# Patient Record
Sex: FEMALE | Race: WHITE | NOT HISPANIC OR LATINO | Employment: FULL TIME | URBAN - METROPOLITAN AREA
[De-identification: names, ages, dates, MRNs, and addresses within clinical notes are randomized per-mention and may not be internally consistent; named-entity substitution may affect disease eponyms.]

---

## 2018-01-11 ENCOUNTER — OFFICE VISIT CONVERTED (OUTPATIENT)
Dept: FAMILY MEDICINE CLINIC | Facility: CLINIC | Age: 33
End: 2018-01-11
Attending: NURSE PRACTITIONER

## 2018-04-10 ENCOUNTER — OFFICE VISIT CONVERTED (OUTPATIENT)
Dept: FAMILY MEDICINE CLINIC | Facility: CLINIC | Age: 33
End: 2018-04-10
Attending: NURSE PRACTITIONER

## 2018-05-17 ENCOUNTER — OFFICE VISIT CONVERTED (OUTPATIENT)
Dept: FAMILY MEDICINE CLINIC | Facility: CLINIC | Age: 33
End: 2018-05-17
Attending: NURSE PRACTITIONER

## 2018-06-25 ENCOUNTER — CONVERSION ENCOUNTER (OUTPATIENT)
Dept: FAMILY MEDICINE CLINIC | Facility: CLINIC | Age: 33
End: 2018-06-25

## 2018-06-25 ENCOUNTER — OFFICE VISIT CONVERTED (OUTPATIENT)
Dept: FAMILY MEDICINE CLINIC | Facility: CLINIC | Age: 33
End: 2018-06-25
Attending: NURSE PRACTITIONER

## 2018-07-17 ENCOUNTER — OFFICE VISIT CONVERTED (OUTPATIENT)
Dept: FAMILY MEDICINE CLINIC | Facility: CLINIC | Age: 33
End: 2018-07-17
Attending: NURSE PRACTITIONER

## 2018-07-17 ENCOUNTER — CONVERSION ENCOUNTER (OUTPATIENT)
Dept: FAMILY MEDICINE CLINIC | Facility: CLINIC | Age: 33
End: 2018-07-17

## 2018-08-15 ENCOUNTER — OFFICE VISIT CONVERTED (OUTPATIENT)
Dept: FAMILY MEDICINE CLINIC | Facility: CLINIC | Age: 33
End: 2018-08-15
Attending: NURSE PRACTITIONER

## 2018-10-17 ENCOUNTER — OFFICE VISIT CONVERTED (OUTPATIENT)
Dept: FAMILY MEDICINE CLINIC | Facility: CLINIC | Age: 33
End: 2018-10-17
Attending: NURSE PRACTITIONER

## 2018-12-20 ENCOUNTER — OFFICE VISIT CONVERTED (OUTPATIENT)
Dept: FAMILY MEDICINE CLINIC | Facility: CLINIC | Age: 33
End: 2018-12-20
Attending: NURSE PRACTITIONER

## 2019-03-11 ENCOUNTER — OFFICE VISIT CONVERTED (OUTPATIENT)
Dept: FAMILY MEDICINE CLINIC | Facility: CLINIC | Age: 34
End: 2019-03-11
Attending: NURSE PRACTITIONER

## 2019-04-08 ENCOUNTER — CONVERSION ENCOUNTER (OUTPATIENT)
Dept: OTHER | Facility: HOSPITAL | Age: 34
End: 2019-04-08

## 2019-04-10 ENCOUNTER — HOSPITAL ENCOUNTER (OUTPATIENT)
Dept: OTHER | Facility: HOSPITAL | Age: 34
Discharge: HOME | End: 2019-04-10
Attending: OBSTETRICS & GYNECOLOGY

## 2019-04-15 LAB
CONV LAST MENSTURAL PERIOD: NORMAL
SPECIMEN SOURCE: NORMAL
SPECIMEN SOURCE: NORMAL
THIN PREP CVX: NORMAL

## 2019-06-28 ENCOUNTER — HOSPITAL ENCOUNTER (OUTPATIENT)
Dept: OTHER | Facility: HOSPITAL | Age: 34
Discharge: HOME | End: 2019-06-28

## 2019-06-28 LAB
ALBUMIN SERPL-MCNC: 4 G/DL (ref 3.5–5)
ALBUMIN/GLOB SERPL: 1.3 {RATIO} (ref 1.4–2.6)
ALP SERPL-CCNC: 80 U/L (ref 42–98)
ALT SERPL-CCNC: 28 U/L (ref 10–40)
ANION GAP SERPL CALC-SCNC: 13 MMOL/L (ref 8–19)
AST SERPL-CCNC: 20 U/L (ref 15–50)
BASOPHILS # BLD AUTO: 0.04 10*3/UL (ref 0–0.2)
BASOPHILS NFR BLD AUTO: 0.8 % (ref 0–3)
BILIRUB SERPL-MCNC: 0.44 MG/DL (ref 0.2–1.3)
BUN SERPL-MCNC: 8 MG/DL (ref 5–25)
BUN/CREAT SERPL: 13 {RATIO} (ref 6–20)
CALCIUM SERPL-MCNC: 8.7 MG/DL (ref 8.7–10.4)
CHLORIDE SERPL-SCNC: 102 MMOL/L (ref 99–111)
CHOLEST SERPL-MCNC: 158 MG/DL (ref 107–200)
CHOLEST/HDLC SERPL: 4.3 {RATIO} (ref 3–6)
CONV ABS IMM GRAN: 0.01 10*3/UL (ref 0–0.2)
CONV CO2: 30 MMOL/L (ref 22–32)
CONV IMMATURE GRAN: 0.2 % (ref 0–1.8)
CONV TOTAL PROTEIN: 7.2 G/DL (ref 6.3–8.2)
CREAT UR-MCNC: 0.62 MG/DL (ref 0.5–0.9)
DEPRECATED RDW RBC AUTO: 39.9 FL (ref 36.4–46.3)
EOSINOPHIL # BLD AUTO: 0.15 10*3/UL (ref 0–0.7)
EOSINOPHIL # BLD AUTO: 3.1 % (ref 0–7)
ERYTHROCYTE [DISTWIDTH] IN BLOOD BY AUTOMATED COUNT: 12.5 % (ref 11.7–14.4)
GFR SERPLBLD BASED ON 1.73 SQ M-ARVRAT: >60 ML/MIN/{1.73_M2}
GLOBULIN UR ELPH-MCNC: 3.2 G/DL (ref 2–3.5)
GLUCOSE SERPL-MCNC: 84 MG/DL (ref 65–99)
HBA1C MFR BLD: 11.1 G/DL (ref 12–16)
HCT VFR BLD AUTO: 33.2 % (ref 37–47)
HDLC SERPL-MCNC: 37 MG/DL (ref 40–60)
LDLC SERPL CALC-MCNC: 113 MG/DL (ref 70–100)
LYMPHOCYTES # BLD AUTO: 1.64 10*3/UL (ref 1–5)
MCH RBC QN AUTO: 29.2 PG (ref 27–31)
MCHC RBC AUTO-ENTMCNC: 33.4 G/DL (ref 33–37)
MCV RBC AUTO: 87.4 FL (ref 81–99)
MONOCYTES # BLD AUTO: 0.34 10*3/UL (ref 0.2–1.2)
MONOCYTES NFR BLD AUTO: 7.1 % (ref 3–10)
NEUTROPHILS # BLD AUTO: 2.6 10*3/UL (ref 2–8)
NEUTROPHILS NFR BLD AUTO: 54.5 % (ref 30–85)
NRBC CBCN: 0 % (ref 0–0.7)
OSMOLALITY SERPL CALC.SUM OF ELEC: 292 MOSM/KG (ref 273–304)
PLATELET # BLD AUTO: 280 10*3/UL (ref 130–400)
PMV BLD AUTO: 9.8 FL (ref 9.4–12.3)
POTASSIUM SERPL-SCNC: 3.1 MMOL/L (ref 3.5–5.3)
RBC # BLD AUTO: 3.8 10*6/UL (ref 4.2–5.4)
SODIUM SERPL-SCNC: 142 MMOL/L (ref 135–147)
TRIGL SERPL-MCNC: 39 MG/DL (ref 40–150)
TSH SERPL-ACNC: 1.71 M[IU]/L (ref 0.27–4.2)
VARIANT LYMPHS NFR BLD MANUAL: 34.3 % (ref 20–45)
VLDLC SERPL-MCNC: 8 MG/DL (ref 5–37)
WBC # BLD AUTO: 4.78 10*3/UL (ref 4.8–10.8)

## 2019-07-16 ENCOUNTER — OFFICE VISIT CONVERTED (OUTPATIENT)
Dept: FAMILY MEDICINE CLINIC | Facility: CLINIC | Age: 34
End: 2019-07-16
Attending: NURSE PRACTITIONER

## 2019-08-12 ENCOUNTER — HOSPITAL ENCOUNTER (OUTPATIENT)
Dept: OTHER | Facility: HOSPITAL | Age: 34
Discharge: HOME | End: 2019-08-12
Attending: NURSE PRACTITIONER

## 2019-08-15 ENCOUNTER — OFFICE VISIT CONVERTED (OUTPATIENT)
Dept: FAMILY MEDICINE CLINIC | Facility: CLINIC | Age: 34
End: 2019-08-15
Attending: NURSE PRACTITIONER

## 2019-09-17 ENCOUNTER — OFFICE VISIT CONVERTED (OUTPATIENT)
Dept: FAMILY MEDICINE CLINIC | Facility: CLINIC | Age: 34
End: 2019-09-17
Attending: NURSE PRACTITIONER

## 2019-10-29 ENCOUNTER — OFFICE VISIT CONVERTED (OUTPATIENT)
Dept: FAMILY MEDICINE CLINIC | Facility: CLINIC | Age: 34
End: 2019-10-29
Attending: NURSE PRACTITIONER

## 2019-10-29 ENCOUNTER — CONVERSION ENCOUNTER (OUTPATIENT)
Dept: FAMILY MEDICINE CLINIC | Facility: CLINIC | Age: 34
End: 2019-10-29

## 2019-12-04 ENCOUNTER — OFFICE VISIT CONVERTED (OUTPATIENT)
Dept: FAMILY MEDICINE CLINIC | Facility: CLINIC | Age: 34
End: 2019-12-04
Attending: NURSE PRACTITIONER

## 2019-12-04 ENCOUNTER — CONVERSION ENCOUNTER (OUTPATIENT)
Dept: FAMILY MEDICINE CLINIC | Facility: CLINIC | Age: 34
End: 2019-12-04

## 2020-01-08 ENCOUNTER — CONVERSION ENCOUNTER (OUTPATIENT)
Dept: FAMILY MEDICINE CLINIC | Facility: CLINIC | Age: 35
End: 2020-01-08

## 2020-01-08 ENCOUNTER — OFFICE VISIT CONVERTED (OUTPATIENT)
Dept: FAMILY MEDICINE CLINIC | Facility: CLINIC | Age: 35
End: 2020-01-08
Attending: NURSE PRACTITIONER

## 2020-02-04 ENCOUNTER — OFFICE VISIT CONVERTED (OUTPATIENT)
Dept: FAMILY MEDICINE CLINIC | Facility: CLINIC | Age: 35
End: 2020-02-04
Attending: NURSE PRACTITIONER

## 2020-06-18 ENCOUNTER — HOSPITAL ENCOUNTER (OUTPATIENT)
Dept: OTHER | Facility: HOSPITAL | Age: 35
Discharge: HOME | End: 2020-06-18

## 2020-06-18 LAB
ALBUMIN SERPL-MCNC: 4 G/DL (ref 3.5–5)
ALBUMIN/GLOB SERPL: 1.2 {RATIO} (ref 1.4–2.6)
ALP SERPL-CCNC: 91 U/L (ref 42–98)
ALT SERPL-CCNC: 27 U/L (ref 10–40)
ANION GAP SERPL CALC-SCNC: 11 MMOL/L (ref 8–19)
AST SERPL-CCNC: 25 U/L (ref 15–50)
BASOPHILS # BLD AUTO: 0.07 10*3/UL (ref 0–0.2)
BASOPHILS NFR BLD AUTO: 1.3 % (ref 0–3)
BILIRUB SERPL-MCNC: 0.29 MG/DL (ref 0.2–1.3)
BUN SERPL-MCNC: 9 MG/DL (ref 5–25)
BUN/CREAT SERPL: 12 {RATIO} (ref 6–20)
CALCIUM SERPL-MCNC: 8.7 MG/DL (ref 8.7–10.4)
CHLORIDE SERPL-SCNC: 101 MMOL/L (ref 99–111)
CHOLEST SERPL-MCNC: 166 MG/DL (ref 107–200)
CHOLEST/HDLC SERPL: 4.2 {RATIO} (ref 3–6)
CONV ABS IMM GRAN: 0.01 10*3/UL (ref 0–0.2)
CONV CO2: 27 MMOL/L (ref 22–32)
CONV IMMATURE GRAN: 0.2 % (ref 0–1.8)
CONV TOTAL PROTEIN: 7.3 G/DL (ref 6.3–8.2)
CREAT UR-MCNC: 0.75 MG/DL (ref 0.5–0.9)
DEPRECATED RDW RBC AUTO: 39.3 FL (ref 36.4–46.3)
EOSINOPHIL # BLD AUTO: 0.24 10*3/UL (ref 0–0.7)
EOSINOPHIL # BLD AUTO: 4.6 % (ref 0–7)
ERYTHROCYTE [DISTWIDTH] IN BLOOD BY AUTOMATED COUNT: 12.6 % (ref 11.7–14.4)
GFR SERPLBLD BASED ON 1.73 SQ M-ARVRAT: >60 ML/MIN/{1.73_M2}
GLOBULIN UR ELPH-MCNC: 3.3 G/DL (ref 2–3.5)
GLUCOSE SERPL-MCNC: 73 MG/DL (ref 65–99)
HCT VFR BLD AUTO: 35.7 % (ref 37–47)
HDLC SERPL-MCNC: 40 MG/DL (ref 40–60)
HGB BLD-MCNC: 11.6 G/DL (ref 12–16)
LDLC SERPL CALC-MCNC: 117 MG/DL (ref 70–100)
LYMPHOCYTES # BLD AUTO: 1.75 10*3/UL (ref 1–5)
LYMPHOCYTES NFR BLD AUTO: 33.5 % (ref 20–45)
MCH RBC QN AUTO: 27.9 PG (ref 27–31)
MCHC RBC AUTO-ENTMCNC: 32.5 G/DL (ref 33–37)
MCV RBC AUTO: 85.8 FL (ref 81–99)
MONOCYTES # BLD AUTO: 0.45 10*3/UL (ref 0.2–1.2)
MONOCYTES NFR BLD AUTO: 8.6 % (ref 3–10)
NEUTROPHILS # BLD AUTO: 2.7 10*3/UL (ref 2–8)
NEUTROPHILS NFR BLD AUTO: 51.8 % (ref 30–85)
NRBC CBCN: 0 % (ref 0–0.7)
OSMOLALITY SERPL CALC.SUM OF ELEC: 279 MOSM/KG (ref 273–304)
PLATELET # BLD AUTO: 325 10*3/UL (ref 130–400)
PMV BLD AUTO: 9.8 FL (ref 9.4–12.3)
POTASSIUM SERPL-SCNC: 3 MMOL/L (ref 3.5–5.3)
RBC # BLD AUTO: 4.16 10*6/UL (ref 4.2–5.4)
SODIUM SERPL-SCNC: 136 MMOL/L (ref 135–147)
TRIGL SERPL-MCNC: 47 MG/DL (ref 40–150)
TSH SERPL-ACNC: 1.73 M[IU]/L (ref 0.27–4.2)
VLDLC SERPL-MCNC: 9 MG/DL (ref 5–37)
WBC # BLD AUTO: 5.22 10*3/UL (ref 4.8–10.8)

## 2020-07-02 ENCOUNTER — TELEMEDICINE CONVERTED (OUTPATIENT)
Dept: FAMILY MEDICINE CLINIC | Facility: CLINIC | Age: 35
End: 2020-07-02
Attending: NURSE PRACTITIONER

## 2020-07-28 ENCOUNTER — HOSPITAL ENCOUNTER (OUTPATIENT)
Dept: URGENT CARE | Facility: CLINIC | Age: 35
Discharge: HOME | End: 2020-07-28
Attending: NURSE PRACTITIONER

## 2020-07-28 LAB — SARS-COV-2 RNA SPEC QL NAA+PROBE: NOT DETECTED

## 2020-07-30 ENCOUNTER — HOSPITAL ENCOUNTER (OUTPATIENT)
Dept: URGENT CARE | Facility: CLINIC | Age: 35
Discharge: HOME | End: 2020-07-30
Attending: NURSE PRACTITIONER

## 2020-07-30 ENCOUNTER — TELEMEDICINE CONVERTED (OUTPATIENT)
Dept: FAMILY MEDICINE CLINIC | Facility: CLINIC | Age: 35
End: 2020-07-30
Attending: NURSE PRACTITIONER

## 2020-07-31 LAB — SARS-COV-2 RNA SPEC QL NAA+PROBE: NOT DETECTED

## 2020-11-11 ENCOUNTER — OFFICE VISIT CONVERTED (OUTPATIENT)
Dept: FAMILY MEDICINE CLINIC | Facility: CLINIC | Age: 35
End: 2020-11-11
Attending: NURSE PRACTITIONER

## 2020-11-11 ENCOUNTER — CONVERSION ENCOUNTER (OUTPATIENT)
Dept: FAMILY MEDICINE CLINIC | Facility: CLINIC | Age: 35
End: 2020-11-11

## 2020-11-25 ENCOUNTER — HOSPITAL ENCOUNTER (OUTPATIENT)
Dept: URGENT CARE | Facility: CLINIC | Age: 35
Discharge: HOME | End: 2020-11-25
Attending: EMERGENCY MEDICINE

## 2020-11-28 LAB — SARS-COV-2 RNA SPEC QL NAA+PROBE: DETECTED

## 2020-12-28 ENCOUNTER — HOSPITAL ENCOUNTER (OUTPATIENT)
Dept: OTHER | Facility: HOSPITAL | Age: 35
Discharge: HOME | End: 2020-12-28
Attending: INTERNAL MEDICINE

## 2021-01-21 ENCOUNTER — HOSPITAL ENCOUNTER (OUTPATIENT)
Dept: OTHER | Facility: HOSPITAL | Age: 36
Discharge: HOME | End: 2021-01-21
Attending: INTERNAL MEDICINE

## 2021-02-02 ENCOUNTER — OFFICE VISIT CONVERTED (OUTPATIENT)
Dept: FAMILY MEDICINE CLINIC | Facility: CLINIC | Age: 36
End: 2021-02-02
Attending: NURSE PRACTITIONER

## 2021-03-02 ENCOUNTER — TELEMEDICINE CONVERTED (OUTPATIENT)
Dept: FAMILY MEDICINE CLINIC | Facility: CLINIC | Age: 36
End: 2021-03-02
Attending: NURSE PRACTITIONER

## 2021-04-01 ENCOUNTER — TELEMEDICINE CONVERTED (OUTPATIENT)
Dept: FAMILY MEDICINE CLINIC | Facility: CLINIC | Age: 36
End: 2021-04-01
Attending: NURSE PRACTITIONER

## 2021-05-04 ENCOUNTER — TELEMEDICINE CONVERTED (OUTPATIENT)
Dept: FAMILY MEDICINE CLINIC | Facility: CLINIC | Age: 36
End: 2021-05-04
Attending: NURSE PRACTITIONER

## 2021-05-13 NOTE — PROGRESS NOTES
Progress Note      Patient Name: Ofelia Barrera   Patient ID: [id number]   Sex: Female   Birthdate: Sue 10, 1985    Primary Care Provider: Jojo SMALLS   Referring Provider: Jojo SMALLS    Visit Date: July 2, 2020    Provider: SERINA Smith   Location: [facility]   Location Address: [address] [place] [address], [address]  [place]  [id number]   Location Phone: [phone]          Chief Complaint  · anxiety      History Of Present Illness  Video Conferencing Visit  Ofelia Barrera is a 35 year old /White female who is presenting for evaluation via video conferencing via [place]. Verbal consent obtained before beginning visit.   The following staff were present during this visit: SERINA Smith and FABIOLA Peace   Ofelia Barrera is a 35 year old /White female who presents for evaluation and treatment of:      Pt has been having increased anxiety recently and would like to start a medication. Pt has not been on any previous anxiety medications and did not prefer one over the other.    Pt's  recently had a second affair and the lady he had an affair with is pregnant she is having trouble focusing and not crying all the time.       Past Medical History  Disease Name Date Onset Notes   Back Pain  --  --    Calculus Of Kidney --  --    Hypertension, essential --  --    Lumbar disc w/o myelopathy 11/19/2013 --    Pap smear for cervical cancer screening 3/1/2019 EPW   Sciatica 11/19/2013 Recurrent symptoms in the left S1 dist.   Weight gain --  --          Past Surgical History  Procedure Name Date Notes   Lithotripsy --  --    Minimally invasive Discectomy 10/28/2013 --          Medication List  Name Date Started Instructions   Ativan 1 mg oral tablet  take 1/2 to 1 tablet by mouth TID PRN   hydrochlorothiazide 25 mg oral tablet 04/14/2020 TAKE ONE TABLET BY MOUTH DAILY   lisinopril 10 mg oral tablet 04/14/2020 TAKE ONE TABLET BY MOUTH DAILY   Mirena 20 mcg/24 hour  (5 years) intrauterine intrauterine device  --    multivitamin oral tablet  take 1 tablet by oral route daily         Allergy List  Allergen Name Date Reaction Notes   NO KNOWN DRUG ALLERGIES --  --  --          Family Medical History  Disease Name Relative/Age Notes   Spine Problems  --          Reproductive History  Menstrual   Age Menarche: 11 Method of Birth Control: IUD   Pregnancy Summary   Total Pregnancies: 1 Full Term: 1 Premature: 0   Ab Induced: 0 Ab Spontaneous: 0 Ectopics: 0   Multiples: 0 Livin         Social History  Finding Status Start/Stop Quantity Notes   Alcohol Never --/-- --  2018 -     --  --/-- --  --    Moderate Amount of Exercise (1-3 times weekly) --  --/-- --  --    No known infection risk --  --/-- --  --    Tobacco Never --/-- --  --          Immunizations  NameDate Admin Mfg Trade Name Lot Number Route Inj VIS Given VIS Publication   [id number]/2019 NE Not Entered  NE NE     Comments: pt reports flu received at [facility] 10/19   Vbmvxuven02/2018 NE Not Entered  NE NE     Comments: Administered at [facility]   Tdap2016 SKB BOOSTRIX [id number]  RA 2016   Comments:          Review of Systems  · Constitutional  o Denies  o : fever, fatigue, weight loss, weight gain  · Cardiovascular  o Denies  o : lower extremity edema, claudication, chest pressure, palpitations  · Respiratory  o Denies  o : shortness of breath, wheezing, cough, hemoptysis, dyspnea on exertion  · Gastrointestinal  o Denies  o : nausea, vomiting, diarrhea, constipation, abdominal pain  · Psychiatric  o Admits  o : anxiety      Physical Examination  · Constitutional  o Appearance  o : well-nourished, well developed, alert, in no acute distress  · Psychiatric  o Mood and Affect  o : mood normal, affect appropriate, denies any SI/HI          Assessment  · Anxiety disorder     300.00/F41.9  · Essential hypertension     401.9/I10      Plan  · Orders  o FELIPE Report (KASPR) - -  "07/03/2020  o ACO-39: Current medications updated and reviewed () - - 07/02/2020  · Medications  o Medications have been Reconciled  o Transition of Care or Provider Policy  · Instructions  o Discussed the need for therapy, either with a certified counselor, psychologist, and/or family . If no improvement is noted or worsening of their condition, return to office or ER. But also discussed with patient that if they are non-responsive to the type of medication they may need to see a psychiatrist for further evaluation and management.  o Patient agrees to a \"No Self Harm\" contract. Patient will either call , [facility], ER, [facility], [facility].  o Obtained a written consent for FELIPE query. Discussed the risk and benefits of the use of controlled substances with the patient, including the risk of tolerance and drug dependence. The patient has been counseled on the need to have an exit strategy, including potentially discontinuing the use of controlled substances. FELIPE has or will be reviewed as soon as it becomes avaliable.  o Patient was educated/instructed on their diagnosis, treatment and medications prior to discharge from the clinic today.  o Patient instructed to seek medical attention urgently for new or worsening symptoms.  o Call the office with any concerns or questions.  o started Ativan PRN  · Disposition  o Return Visit Request in/on 1 month +/- 2 weeks ([id number]).            Electronically Signed by: SERINA Smith -Author on July 3, 2020 09:37:14 AM  "

## 2021-05-13 NOTE — PROGRESS NOTES
Progress Note      Patient Name: Ofelia Barrera   Patient ID: [id number]   Sex: Female   Birthdate: Sue 10, 1985    Primary Care Provider: Jojo SMALLS   Referring Provider: Jojo SMALLS    Visit Date: November 11, 2020    Provider: SERINA Smith   Location: [facility]   Location Address: [address] [place] [address], [address]  [place]  [id number]   Location Phone: [phone]          Chief Complaint  · physical for insurance      History Of Present Illness  Ofelia Barrera is a 35 year old /White female who presents for evaluation and treatment of:      Pt is here for a physical for insurance. She did not bring her form with her, she will get to office to complete.     Pt has no concerns today    Pt has received the flu vaccine and HRA labs done 7/30/2020.       Past Medical History  Disease Name Date Onset Notes   Back Pain  --  --    Calculus Of Kidney --  --    Hypertension, essential --  --    Lumbar disc w/o myelopathy 11/19/2013 --    Pap smear for cervical cancer screening 3/1/2019 EPW   Sciatica 11/19/2013 Recurrent symptoms in the left S1 dist.   Weight gain --  --          Past Surgical History  Procedure Name Date Notes   Lithotripsy --  --    Minimally invasive Discectomy 10/28/2013 --          Medication List  Name Date Started Instructions   hydrochlorothiazide 25 mg oral tablet 10/20/2020 TAKE ONE TABLET BY MOUTH DAILY   lisinopril 10 mg oral tablet 10/20/2020 TAKE ONE TABLET BY MOUTH DAILY   Mirena 20 mcg/24 hour (5 years) intrauterine intrauterine device  --    multivitamin oral tablet  take 1 tablet by oral route daily         Allergy List  Allergen Name Date Reaction Notes   NO KNOWN DRUG ALLERGIES --  --  --          Family Medical History  Disease Name Relative/Age Notes   Spine Problems  --          Reproductive History  Menstrual   Age Menarche: 11 Method of Birth Control: IUD   Pregnancy Summary   Total Pregnancies: 1 Full Term: 1 Premature: 0  "  Ab Induced: 0 Ab Spontaneous: 0 Ectopics: 0   Multiples: 0 Livin         Social History  Finding Status Start/Stop Quantity Notes   Alcohol Never --/-- --  2018 -     --  --/-- --  --    Moderate Amount of Exercise (1-3 times weekly) --  --/-- --  --    No known infection risk --  --/-- --  --    Tobacco Never --/-- --  --          Immunizations  NameDate Admin Mfg Trade Name Lot Number Route Inj VIS Given VIS Publication   [id number]/2019 NE Not Entered  NE NE     Comments: pt reports flu received at [facility] 10/19   Tdap2016 SKB BOOSTRIX [id number] IM RA 2016   Comments:          Review of Systems  · Constitutional  o Denies  o : fever, fatigue, weight loss, weight gain  · Cardiovascular  o Denies  o : lower extremity edema, claudication, chest pressure, palpitations  · Respiratory  o Denies  o : shortness of breath, wheezing, cough, hemoptysis, dyspnea on exertion  · Gastrointestinal  o Denies  o : nausea, vomiting, diarrhea, constipation, abdominal pain      Vitals  Date Time BP Position Site L\R Cuff Size HR RR TEMP (F) WT  HT  BMI kg/m2 BSA m2 O2 Sat FR L/min FiO2 HC       2019 08:09 /72 Sitting    78 - R   177lbs 6oz 5'  8\" 26.97 1.96 100 %      2020 08:03 /77 Sitting    88 - R   174lbs 16oz 5'  8\" 26.61 1.95 100 %      2020 08:17 /75 Sitting    73 - R   179lbs 2oz 5'  8\" 27.24 1.97 100 %      2020 02:08 /69 Sitting    72 - R   210lbs 4oz 5'  8\" 31.97 2.14 100 %            Physical Examination  · Constitutional  o Appearance  o : well-nourished, well developed, alert, in no acute distress  · Ears, Nose, Mouth and Throat  o Ears  o :   § External Ears  § : appearance within normal limits, no lesions present  § Otoscopic Examination  § : tympanic membrane appearance within normal limits bilaterally without perforations, mobility normal  o Nose  o :   § External Nose  § : normal stucture noted.  § Intranasal Exam  § : no " swelling, reddness, turbs normal deniz.  o Oral Cavity  o :   § Oral Mucosa  § : oral mucosa normal without pallor or cyanosis  § Lips  § : lip appearance normal  § Teeth  § : normal dentition for age  § Gums  § : gums pink, non-swollen, no bleeding present  § Tongue  § : tongue appearance normal  § Palate  § : hard palate normal, soft palate appearance normal  o Throat  o :   § Oropharynx  § : no inflammation or lesions present, tonsils within normal limits  · Respiratory  o Respiratory Effort  o : breathing unlabored  o Auscultation of Lungs  o : normal breath sounds throughout  · Cardiovascular  o Heart  o :   § Auscultation of Heart  § : regular rate and rhythm, no murmurs, gallops or rubs  § Palpation of Heart  § : normal apical impulse, no cardiac thrill present  o Peripheral Vascular System  o :   § Extremities  § : no cyanosis, clubbing or edema; less than 2 second refill noted  · Gastrointestinal  o Abdominal Examination  o : abdomen nontender to palpation, tone normal without rigidity or guarding, no masses present, abdominal contour scaphoid  o Liver and spleen  o : no hepatomegaly present, liver nontender to palpation, spleen not palpable  · Skin and Subcutaneous Tissue  o General Inspection  o : no rashes or lesions present, no areas of discoloration  · Neurologic  o Mental Status Examination  o :   § Orientation  § : grossly oriented to person, place and time  o Cranial Nerves  o : cranial nerves intact and symmetric throughout  · Psychiatric  o Mood and Affect  o : mood normal, affect appropriate, denies any SI/HI          Assessment  · Annual physical exam     V70.0/Z00.00  · Screening for depression     V79.0/Z13.89  · Hypertension, essential     401.9/I10    Problems Reconciled  Plan  · Orders  o ACO-18: Negative screen for clinical depression using a standardized tool () - V79.0/Z13.89 - 11/11/2020  o ACO-39: Current medications updated and reviewed (, 1159F) - - 11/11/2020  o ACO-18:  Negative screen for clinical depression using a standardized tool () - - 11/11/2020  o ACO-14: Influenza immunization administered or previously received [facility] () - - 11/11/2020  · Medications  o Medications have been Reconciled  o Transition of Care or Provider Policy  · Instructions  o Reviewed health maintenance flowsheet and updated information. Orders were placed and/or patient's response was documented.  o Depression Screen completed and scanned into the EMR under the designated folder within the patient's documents.  o Today's PHQ-9 result is _0__  o The provider screening met the required time of 15 minutes.  o Patient was educated/instructed on their diagnosis, treatment and medications prior to discharge from the clinic today.  o Patient instructed to seek medical attention urgently for new or worsening symptoms.  o Call the office with any concerns or questions.            Electronically Signed by: SERINA Smiht -Author on November 11, 2020 02:41:11 PM

## 2021-05-13 NOTE — PROGRESS NOTES
"   Progress Note      Patient Name: Ofelia Barrera   Patient ID: [id number]   Sex: Female   Birthdate: Sue 10, 1985    Primary Care Provider: Jojo SMALLS   Referring Provider: Jojo SMALLS    Visit Date: July 30, 2020    Provider: SERINA Smith   Location: [facility]   Location Address: [address] [place] [address], [address]  [place]  [id number]   Location Phone: [phone]          Chief Complaint  · anxiety follow up      History Of Present Illness  Video Conferencing Visit  Ofelia Barrera is a 35 year old /White female who is presenting for evaluation via video conferencing via [employer] [place]. Verbal consent obtained before beginning visit.   The following staff were present during this visit: Josefina Flores MA   Ofelia Barrera is a 35 year old /White female who presents for evaluation and treatment of:      Pt is following up on her anxiety, she notes that it is much better. She has no concerns at this time.  She met with a  and has a plan if the lady her  had an affair with is in fact pregnant.  She also states that they are no longer having any contact with that lady and just \"working on their marriage.\"    She has only taken the Ativan at night a few times when her mind won't shut off.    She is due S and will stop into clinic for this.    She is on Covid quarantine and has had one test and is still waiting on results.  She was exposed at work.           Past Medical History  Disease Name Date Onset Notes   Back Pain  --  --    Calculus Of Kidney --  --    Hypertension, essential --  --    Lumbar disc w/o myelopathy 11/19/2013 --    Pap smear for cervical cancer screening 3/1/2019 EPW   Sciatica 11/19/2013 Recurrent symptoms in the left S1 dist.   Weight gain --  --          Past Surgical History  Procedure Name Date Notes   Lithotripsy --  --    Minimally invasive Discectomy 10/28/2013 --          Medication List  Name Date Started Instructions   Ativan 1 mg oral " tablet  take 1/2 to 1 tablet by mouth TID PRN   hydrochlorothiazide 25 mg oral tablet 2020 TAKE ONE TABLET BY MOUTH DAILY   lisinopril 10 mg oral tablet 2020 TAKE ONE TABLET BY MOUTH DAILY   Mirena 20 mcg/24 hour (5 years) intrauterine intrauterine device  --    multivitamin oral tablet  take 1 tablet by oral route daily         Allergy List  Allergen Name Date Reaction Notes   NO KNOWN DRUG ALLERGIES --  --  --          Family Medical History  Disease Name Relative/Age Notes   Spine Problems  --          Reproductive History  Menstrual   Age Menarche: 11 Method of Birth Control: IUD   Pregnancy Summary   Total Pregnancies: 1 Full Term: 1 Premature: 0   Ab Induced: 0 Ab Spontaneous: 0 Ectopics: 0   Multiples: 0 Livin         Social History  Finding Status Start/Stop Quantity Notes   Alcohol Never --/-- --  2018 -     --  --/-- --  --    Moderate Amount of Exercise (1-3 times weekly) --  --/-- --  --    No known infection risk --  --/-- --  --    Tobacco Never --/-- --  --          Immunizations  NameDate Admin Mfg Trade Name Lot Number Route Inj VIS Given VIS Publication   [id number]/2019 NE Not Entered  NE NE     Comments: pt reports flu received at [facility] 10/19   Jjfvgufph61/2018 NE Not Entered  NE NE     Comments: Administered at [facility]   Tdap2016 SKB BOOSTRIX [id number] IM RA 2016   Comments:          Review of Systems  · Constitutional  o Denies  o : fever, fatigue, weight loss, weight gain  · Cardiovascular  o Denies  o : lower extremity edema, claudication, chest pressure, palpitations  · Respiratory  o Denies  o : shortness of breath, wheezing, cough, hemoptysis, dyspnea on exertion  · Gastrointestinal  o Denies  o : nausea, vomiting, diarrhea, constipation, abdominal pain  · Psychiatric  o Admits  o : anxiety      Physical Examination  · Constitutional  o Appearance  o : well-nourished, well developed, alert, in no acute distress  · Skin and Subcutaneous  "Tissue  o General Inspection  o : no rashes or lesions present, no areas of discoloration  · Neurologic  o Mental Status Examination  o :   § Orientation  § : grossly oriented to person, place and time  · Psychiatric  o Mood and Affect  o : mood normal, affect appropriate, denies any SI/HI          Assessment  · Anxiety disorder     300.00/F41.9  · Hypertension, essential     401.9/I10      Plan  · Orders  o FELIPE Report (KASPR) - - 07/30/2020  o ACO-39: Current medications updated and reviewed () - - 07/30/2020  · Medications  o Medications have been Reconciled  o Transition of Care or Provider Policy  · Instructions  o Patient agrees to a \"No Self Harm\" contract. Patient will either call , [facility], , [facility], [facility].  o Obtained a written consent for FELIPE query. Discussed the risk and benefits of the use of controlled substances with the patient, including the risk of tolerance and drug dependence. The patient has been counseled on the need to have an exit strategy, including potentially discontinuing the use of controlled substances. FELIPE has or will be reviewed as soon as it becomes avaliable.  o Take all medications as prescribed/directed.  o Patient was educated/instructed on their diagnosis, treatment and medications prior to discharge from the clinic today.  o Patient instructed to seek medical attention urgently for new or worsening symptoms.  o Call the office with any concerns or questions.  o pt to stop into clinic to update UDS  · Disposition  o Return Visit Request in/on 6 months +/- 2 weeks ([id number]).            Electronically Signed by: SERINA Smith -Author on July 30, 2020 09:37:15 AM  "

## 2021-05-14 VITALS
OXYGEN SATURATION: 100 % | HEART RATE: 72 BPM | DIASTOLIC BLOOD PRESSURE: 69 MMHG | BODY MASS INDEX: 31.87 KG/M2 | HEIGHT: 68 IN | WEIGHT: 210.25 LBS | SYSTOLIC BLOOD PRESSURE: 124 MMHG

## 2021-05-14 NOTE — PROGRESS NOTES
Progress Note      Patient Name: Ofelia Barrera   Patient ID: [id number]   Sex: Female   Birthdate: Sue 10, 1985    Primary Care Provider: Jojo SMALLS   Referring Provider: Jojo SMALLS    Visit Date: May 4, 2021    Provider: SERINA Smith   Location: [facility]   Location Address: [address] [place] [address], [address]  [place]  [id number]   Location Phone: [phone]          Chief Complaint  · Phentermine #4      History Of Present Illness  Video Conferencing Visit  Ofelia Barrera is a 35 year old /White female who is presenting for evaluation via video conferencing via [place]. Verbal consent obtained before beginning visit.   The following staff were present during this visit: SERINA Smith and KRYSTIN Peace   Ofelia Barrera is a 35 year old /White female who presents for evaluation and treatment of:      Pt is a f/u for phentermine #4. Pt's start weight was 210 lbs. Pt reports weight at 183 lbs this morning. Pt has lost a total of 27 lbs. No issues or concerns today.       Past Medical History  Disease Name Date Onset Notes   Back Pain  --  --    Calculus Of Kidney --  --    Essential hypertension --  --    Lumbar disc w/o myelopathy 11/19/2013 --    Pap smear for cervical cancer screening 3/1/2019 EPW   Sciatica 11/19/2013 Recurrent symptoms in the left S1 dist.   Weight gain --  --          Past Surgical History  Procedure Name Date Notes   Lithotripsy --  --    Minimally invasive Discectomy 10/28/2013 --          Medication List  Name Date Started Instructions   hydrochlorothiazide 25 mg oral tablet 02/02/2021 TAKE ONE TABLET BY MOUTH DAILY   lisinopril 10 mg oral tablet 02/02/2021 TAKE ONE TABLET BY MOUTH DAILY   Mirena 20 mcg/24 hour (5 years) intrauterine intrauterine device  --    multivitamin oral tablet  take 1 tablet by oral route daily   phentermine 37.5 mg oral tablet 05/04/2021 take 1 tablet (37.5 mg) by oral route once  daily before breakfast for 30 days         Allergy List  Allergen Name Date Reaction Notes   NO KNOWN DRUG ALLERGIES --  --  --          Family Medical History  Disease Name Relative/Age Notes   Spine Problems  --          Reproductive History  Menstrual   Age Menarche: 11 Method of Birth Control: IUD   Pregnancy Summary   Total Pregnancies: 1 Full Term: 1 Premature: 0   Ab Induced: 0 Ab Spontaneous: 0 Ectopics: 0   Multiples: 0 Livin         Social History  Finding Status Start/Stop Quantity Notes   Alcohol Never --/-- --  2018 -     --  --/-- --  --    Moderate Amount of Exercise (1-3 times weekly) --  --/-- --  --    No known infection risk --  --/-- --  --    Tobacco Never --/-- --  --          Immunizations  NameDate Admin Mfg Trade Name Lot Number Route Inj VIS Given VIS Publication   Acezyywqd03/2020 SKB Fluzone Quadrivalent  NE NE     Comments:    Tdap2016 SKB BOOSTRIX [id number] IM RA 2016   Comments:          Review of Systems  · Constitutional  o Admits  o : weight gain  o Denies  o : fever, fatigue, weight loss  · Cardiovascular  o Denies  o : lower extremity edema, claudication, chest pressure, palpitations  · Respiratory  o Denies  o : shortness of breath, wheezing, cough, hemoptysis, dyspnea on exertion  · Gastrointestinal  o Denies  o : nausea, vomiting, diarrhea, constipation, abdominal pain      Physical Examination  · Constitutional  o Appearance  o : well-nourished, well developed, alert, in no acute distress  · Neurologic  o Mental Status Examination  o :   § Orientation  § : grossly oriented to person, place and time  · Psychiatric  o Mood and Affect  o : mood normal, affect appropriate          Assessment  · Essential hypertension     401.9/I10  · Weight gain     783.1/R63.5      Plan  · Orders  o FELIPE Report (KASPR) - - 2021  o ACO-39: Current medications updated and reviewed (, 1159F) - - 2021  · Medications  o phentermine 37.5 mg oral  tablet   SIG: take 1 tablet (37.5 mg) by oral route once daily before breakfast for 30 days   DISP: (30) Tablet with 0 refills  Refilled on 05/04/2021     o Medications have been Reconciled  o Transition of Care or Provider Policy  · Instructions  o Patient advised to monitor blood pressure (B/P) at home and journal readings. Patient informed that a B/P reading at home of more than 130/80 is considered hypertension. For readings greater olcr174/90 or higher patient is advised to follow up in the office with readings for management. Patient advised to limit sodium intake.  o Obtained a written consent for FELIPE query. Discussed the risk and benefits of the use of controlled substances with the patient, including the risk of tolerance and drug dependence. The patient has been counseled on the need to have an exit strategy, including potentially discontinuing the use of controlled substances. FELIPE has or will be reviewed as soon as it becomes avaliable.  o Patient was educated/instructed on their diagnosis, treatment and medications prior to discharge from the clinic today.  o Patient counseled to reduce calorie intake.  o Patient was instructed to exercise regularly.  o Patient instructed to seek medical attention urgently for new or worsening symptoms.  o Call the office with any concerns or questions.  · Disposition  o Return Visit Request in/on 1 month +/- 2 weeks ([id number]).            Electronically Signed by: SERINA Smith -Author on May 4, 2021 07:33:59 AM

## 2021-05-14 NOTE — PROGRESS NOTES
Progress Note      Patient Name: Ofelia Barrera   Patient ID: [id number]   Sex: Female   Birthdate: Sue 10, 1985    Primary Care Provider: Jojo SMALLS   Referring Provider: Jojo SMALLS    Visit Date: February 2, 2021    Provider: SERINA Smith   [facility]: [facility]   Location Address: [address] [place] [address], [address]  [place], [place]  [id number]   Location Phone: [phone]          Chief Complaint  · 6 mo f/u      History Of Present Illness  Ofelia Barrera is a 35 year old /White female who presents for evaluation and treatment of:      Pt is here for a 6 mo f/u. Pt would like to discuss restarting phentermine.     Pt will need refills on her Lisnopril and HCTZ sent to [facility].    She has a hx of hypokalemia and was treated  for it and pyelonephritis in ER back in December 2020 and would like her labwork rechecked.       Past Medical History  Disease Name Date Onset Notes   Back Pain  --  --    Calculus Of Kidney --  --    Hypertension, essential --  --    Lumbar disc w/o myelopathy 11/19/2013 --    Pap smear for cervical cancer screening 3/1/2019 EPW   Sciatica 11/19/2013 Recurrent symptoms in the left S1 dist.   Weight gain --  --          Past Surgical History  Procedure Name Date Notes   Lithotripsy --  --    Minimally invasive Discectomy 10/28/2013 --          Medication List  Name Date Started Instructions   hydrochlorothiazide 25 mg oral tablet 02/02/2021 TAKE ONE TABLET BY MOUTH DAILY   lisinopril 10 mg oral tablet 02/02/2021 TAKE ONE TABLET BY MOUTH DAILY   Mirena 20 mcg/24 hour (5 years) intrauterine intrauterine device  --    multivitamin oral tablet  take 1 tablet by oral route daily         Allergy List  Allergen Name Date Reaction Notes   NO KNOWN DRUG ALLERGIES --  --  --        Allergies Reconciled  Family Medical History  Disease Name Relative/Age Notes   Spine Problems  --          Reproductive History  Menstrual   Age Menarche: 11  "Method of Birth Control: IUD   Pregnancy Summary   Total Pregnancies: 1 Full Term: 1 Premature: 0   Ab Induced: 0 Ab Spontaneous: 0 Ectopics: 0   Multiples: 0 Livin         Social History  Finding Status Start/Stop Quantity Notes   Alcohol Never --/-- --  2018 -     --  --/-- --  --    Moderate Amount of Exercise (1-3 times weekly) --  --/-- --  --    No known infection risk --  --/-- --  --    Tobacco Never --/-- --  --          Immunizations  NameDate Admin Mfg Trade Name Lot Number Route Inj VIS Given VIS Publication   Ewwndohec01/2020 SKB Fluzone Quadrivalent  NE NE     Comments:    Tdap2016 SKB BOOSTRIX [id number] IM RA 2016   Comments:          Review of Systems  · Constitutional  o Admits  o : weight gain  o Denies  o : fever, fatigue, weight loss  · Cardiovascular  o Denies  o : lower extremity edema, claudication, chest pressure, palpitations  · Respiratory  o Denies  o : shortness of breath, wheezing, cough, hemoptysis, dyspnea on exertion  · Gastrointestinal  o Denies  o : nausea, vomiting, diarrhea, constipation, abdominal pain      Vitals  Date Time BP Position Site L\R Cuff Size HR RR TEMP (F) WT  HT  BMI kg/m2 BSA m2 O2 Sat FR L/min FiO2 HC       2020 08:17 /75 Sitting    73 - R   179lbs 2oz 5'  8\" 27.24 1.97 100 %      2020 02:08 /69 Sitting    72 - R   210lbs 4oz 5'  8\" 31.97 2.14 100 %      2021 08:32 /65 Sitting    66 - R   210lbs 8oz 0'  8\" 2312.44 0.73 100 %  21%          Physical Examination  · Constitutional  o Appearance  o : well-nourished, well developed, alert, in no acute distress  · Respiratory  o Respiratory Effort  o : breathing unlabored  o Auscultation of Lungs  o : normal breath sounds throughout  · Cardiovascular  o Heart  o :   § Auscultation of Heart  § : regular rate and rhythm, no murmurs, gallops or rubs  § Palpation of Heart  § : normal apical impulse, no cardiac thrill present  o Peripheral Vascular " System  o :   § Extremities  § : no cyanosis, clubbing or edema; less than 2 second refill noted  · Gastrointestinal  o Abdominal Examination  o : abdomen nontender to palpation, tone normal without rigidity or guarding, no masses present, abdominal contour scaphoid  o Liver and spleen  o : no hepatomegaly present, liver nontender to palpation, spleen not palpable  · Skin and Subcutaneous Tissue  o General Inspection  o : no rashes or lesions present, no areas of discoloration  · Neurologic  o Mental Status Examination  o :   § Orientation  § : grossly oriented to person, place and time  o Cranial Nerves  o : cranial nerves intact and symmetric throughout  · Psychiatric  o Mood and Affect  o : mood normal, affect appropriate, denies any SI/HI          Results  · In-Office Procedures  o Lab procedure  § IOP - Urine Drug Screen In-House [facility] (79958)   § Amphetamines Ur Ql: Negative   § Barbiturates Ur Ql: Negative   § Buprenorphine+Nor Ur Ql Scn: Negative   § Benzodiaz Ur Ql: Negative   § Cocaine Ur Ql: Negative   § Methadone Ur Ql: Negative   § Methamphet Ur Ql: Negative   § MDMA Ur Ql Scn: Negative   § Opiates Ur Ql: Negative   § Oxycodone Ur Ql: Negative   § PCP Ur Ql: Negative   § THC Ur Ql: Negative   § Temp in Range?: Within/Acceptable   § Control Seen?: Yes       Assessment  · Essential hypertension     401.9/I10  · Weight gain     783.1/R63.5  · Hypokalemia     276.8/E87.6      Plan  · Orders  o FELIPE Report (KASPR) - - 02/02/2021  o ACO-14: Influenza immunization administered or previously received [facility] () - - 02/02/2021  o ACO-39: Current medications updated and reviewed (1159F, ) - - 02/02/2021  o Potassium (47346) - 276.8/E87.6 - 02/02/2021  · Medications  o phentermine 37.5 mg oral tablet   SIG: take 1 tablet (37.5 mg) by oral route once daily before breakfast for 30 days   DISP: (30) Tablet with 0 refills  Prescribed on 02/02/2021     o hydrochlorothiazide 25 mg oral tablet   SIG: TAKE ONE  TABLET BY MOUTH DAILY   DISP: (90) Tablet with 1 refills  Refilled on 02/02/2021     o lisinopril 10 mg oral tablet   SIG: TAKE ONE TABLET BY MOUTH DAILY   DISP: (90) Tablet with 1 refills  Refilled on 02/02/2021     o Medications have been Reconciled  o Transition of Care or Provider Policy  · Instructions  o Patient advised to monitor blood pressure (B/P) at home and journal readings. Patient informed that a B/P reading at home of more than 130/80 is considered hypertension. For readings greater qydf155/90 or higher patient is advised to follow up in the office with readings for management. Patient advised to limit sodium intake.  o Obtained a written consent for FELIPE query. Discussed the risk and benefits of the use of controlled substances with the patient, including the risk of tolerance and drug dependence. The patient has been counseled on the need to have an exit strategy, including potentially discontinuing the use of controlled substances. FELIPE has or will be reviewed as soon as it becomes avaliable.  o Patient was educated/instructed on their diagnosis, treatment and medications prior to discharge from the clinic today.  o Patient counseled to reduce calorie intake.  o Patient was instructed to exercise regularly.  o Patient instructed to seek medical attention urgently for new or worsening symptoms.  o Call the office with any concerns or questions.  · Disposition  o Call or Return if symptoms worsen or persist.  o Return Visit Request in/on 1 month +/- 2 weeks ([id number]).            Electronically Signed by: SERINA Smith -Author on February 2, 2021 09:02:13 AM

## 2021-05-14 NOTE — PROGRESS NOTES
Progress Note      Patient Name: Ofelia Barrera   Patient ID: [id number]   Sex: Female   Birthdate: Sue 10, 1985    Primary Care Provider: Jojo SMALLS   Referring Provider: Jojo SMALLS    Visit Date: March 2, 2021    Provider: SERINA Smith   Location: [facility]   Location Address: [address] [place] [address], [address]  [place], [place]  [id number]   Location Phone: [phone]          Chief Complaint  · phentermine #2      History Of Present Illness  Video Conferencing Visit  Ofelia Barrera is a 35 year old /White female who is presenting for evaluation via video conferencing via [employer]. Verbal consent obtained before beginning visit.   The following staff were present during this visit: SERINA Smith and KRYSTIN Peace   Ofelia Barrera is a 35 year old /White female who presents for evaluation and treatment of:      Pt is a f/u for phentermine #2. Pt's start weight was 210 lbs. Pt reports weight at 194 lbs this morning. Pt has lost a total of 16 lbs. No issues or concerns at this time.       Past Medical History  Disease Name Date Onset Notes   Back Pain  --  --    Calculus Of Kidney --  --    Hypertension, essential --  --    Lumbar disc w/o myelopathy 11/19/2013 --    Pap smear for cervical cancer screening 3/1/2019 EPW   Sciatica 11/19/2013 Recurrent symptoms in the left S1 dist.   Weight gain --  --          Past Surgical History  Procedure Name Date Notes   Lithotripsy --  --    Minimally invasive Discectomy 10/28/2013 --          Medication List  Name Date Started Instructions   hydrochlorothiazide 25 mg oral tablet 02/02/2021 TAKE ONE TABLET BY MOUTH DAILY   lisinopril 10 mg oral tablet 02/02/2021 TAKE ONE TABLET BY MOUTH DAILY   Mirena 20 mcg/24 hour (5 years) intrauterine intrauterine device  --    multivitamin oral tablet  take 1 tablet by oral route daily   phentermine 37.5 mg oral tablet 03/02/2021 take 1 tablet (37.5 mg) by oral route  once daily before breakfast for 30 days         Allergy List  Allergen Name Date Reaction Notes   NO KNOWN DRUG ALLERGIES --  --  --          Family Medical History  Disease Name Relative/Age Notes   Spine Problems  --          Reproductive History  Menstrual   Age Menarche: 11 Method of Birth Control: IUD   Pregnancy Summary   Total Pregnancies: 1 Full Term: 1 Premature: 0   Ab Induced: 0 Ab Spontaneous: 0 Ectopics: 0   Multiples: 0 Livin         Social History  Finding Status Start/Stop Quantity Notes   Alcohol Never --/-- --  2018 -     --  --/-- --  --    Moderate Amount of Exercise (1-3 times weekly) --  --/-- --  --    No known infection risk --  --/-- --  --    Tobacco Never --/-- --  --          Immunizations  NameDate Admin Mfg Trade Name Lot Number Route Inj VIS Given VIS Publication   Nytwajjmu65/2020 SKB Fluzone Quadrivalent  NE NE     Comments:    Tdap2016 SKB BOOSTRIX [id number] IM RA 2016   Comments:          Review of Systems  · Constitutional  o Admits  o : weight gain  o Denies  o : fever, fatigue, weight loss  · Cardiovascular  o Denies  o : lower extremity edema, claudication, chest pressure, palpitations  · Respiratory  o Denies  o : shortness of breath, wheezing, cough, hemoptysis, dyspnea on exertion  · Gastrointestinal  o Denies  o : nausea, vomiting, diarrhea, constipation, abdominal pain      Physical Examination  · Constitutional  o Appearance  o : well-nourished, well developed, alert, in no acute distress  · Neurologic  o Mental Status Examination  o :   § Orientation  § : grossly oriented to person, place and time  · Psychiatric  o Mood and Affect  o : mood normal, affect appropriate          Assessment  · Essential hypertension     401.9/I10  · Weight gain     783.1/R63.5      Plan  · Orders  o FELIPE Report (KASPR) - - 2021  o ACO-39: Current medications updated and reviewed (, 1159F) - - 2021  · Medications  o phentermine 37.5 mg  oral tablet   SIG: take 1 tablet (37.5 mg) by oral route once daily before breakfast for 30 days   DISP: (30) Tablet with 0 refills  Refilled on 03/02/2021     o Medications have been Reconciled  o Transition of Care or Provider Policy  · Instructions  o Patient advised to monitor blood pressure (B/P) at home and journal readings. Patient informed that a B/P reading at home of more than 130/80 is considered hypertension. For readings greater rynh054/90 or higher patient is advised to follow up in the office with readings for management. Patient advised to limit sodium intake.  o Obtained a written consent for FELIPE query. Discussed the risk and benefits of the use of controlled substances with the patient, including the risk of tolerance and drug dependence. The patient has been counseled on the need to have an exit strategy, including potentially discontinuing the use of controlled substances. FELIPE has or will be reviewed as soon as it becomes avaliable.  o Take all medications as prescribed/directed.  o Patient was educated/instructed on their diagnosis, treatment and medications prior to discharge from the clinic today.  o Patient counseled to reduce calorie intake.  o Patient was instructed to exercise regularly.  o Patient instructed to seek medical attention urgently for new or worsening symptoms.  · Disposition  o Return Visit Request in/on 1 month +/- 2 weeks ([id number]).            Electronically Signed by: SERINA Smith -Author on March 2, 2021 07:54:10 AM

## 2021-05-14 NOTE — PROGRESS NOTES
Progress Note      Patient Name: Ofelia Barrera   Patient ID: [id number]   Sex: Female   Birthdate: Sue 10, 1985    Primary Care Provider: Jojo SMALLS   Referring Provider: Jojo SMALLS    Visit Date: April 1, 2021    Provider: SERINA Smith   Location: [facility]   Location Address: [address] [place] [address], [address]  [place], [place]  [id number]   Location Phone: [phone]          Chief Complaint  · Phentermine #2      History Of Present Illness  Video Conferencing Visit  Ofelia Barrera is a 35 year old /White female who is presenting for evaluation via video conferencing via [email or web address]. Verbal consent obtained before beginning visit.   The following staff were present during this visit: Josefina Flores MA   Ofelia Barrera is a 35 year old /White female who presents for evaluation and treatment of:      Pt is needing refill #2 of phentermine. Her starting weight was 210, current 188, total loss of 22lbs. She has no concerns to address at this time.    UDS and consent 2/21/21         Past Medical History  Disease Name Date Onset Notes   Back Pain  --  --    Calculus Of Kidney --  --    Hypertension, essential --  --    Lumbar disc w/o myelopathy 11/19/2013 --    Pap smear for cervical cancer screening 3/1/2019 EPW   Sciatica 11/19/2013 Recurrent symptoms in the left S1 dist.   Weight gain --  --          Past Surgical History  Procedure Name Date Notes   Lithotripsy --  --    Minimally invasive Discectomy 10/28/2013 --          Medication List  Name Date Started Instructions   hydrochlorothiazide 25 mg oral tablet 02/02/2021 TAKE ONE TABLET BY MOUTH DAILY   lisinopril 10 mg oral tablet 02/02/2021 TAKE ONE TABLET BY MOUTH DAILY   Mirena 20 mcg/24 hour (5 years) intrauterine intrauterine device  --    multivitamin oral tablet  take 1 tablet by oral route daily   phentermine 37.5 mg oral tablet 03/02/2021 take 1 tablet (37.5 mg) by oral route once daily before  breakfast for 30 days         Allergy List  Allergen Name Date Reaction Notes   NO KNOWN DRUG ALLERGIES --  --  --          Family Medical History  Disease Name Relative/Age Notes   Spine Problems  --          Reproductive History  Menstrual   Age Menarche: 11 Method of Birth Control: IUD   Pregnancy Summary   Total Pregnancies: 1 Full Term: 1 Premature: 0   Ab Induced: 0 Ab Spontaneous: 0 Ectopics: 0   Multiples: 0 Livin         Social History  Finding Status Start/Stop Quantity Notes   Alcohol Never --/-- --  2018 -     --  --/-- --  --    Moderate Amount of Exercise (1-3 times weekly) --  --/-- --  --    No known infection risk --  --/-- --  --    Tobacco Never --/-- --  --          Immunizations  NameDate Admin Mfg Trade Name Lot Number Route Inj VIS Given VIS Publication   Wfjrbrhjb82/2020 SKB Fluzone Quadrivalent  NE NE     Comments:    Tdap2016 SKB BOOSTRIX [id number] IM RA 2016   Comments:          Review of Systems  · Constitutional  o Admits  o : weight gain  o Denies  o : fever, fatigue, weight loss  · Cardiovascular  o Denies  o : lower extremity edema, claudication, chest pressure, palpitations  · Respiratory  o Denies  o : shortness of breath, wheezing, cough, hemoptysis, dyspnea on exertion  · Gastrointestinal  o Denies  o : nausea, vomiting, diarrhea, constipation, abdominal pain      Physical Examination  · Constitutional  o Appearance  o : well-nourished, well developed, alert, in no acute distress  · Neurologic  o Mental Status Examination  o :   § Orientation  § : grossly oriented to person, place and time  · Psychiatric  o Mood and Affect  o : mood normal, affect appropriate, denies any SI/HI          Assessment  · Hypertension, essential     401.9/I10  · Weight gain     783.1/R63.5    Problems Reconciled  Plan  · Orders  o FELIPE Report (KASPR) - - 2021  o ACO-39: Current medications updated and reviewed (, 9231Y) - -  04/01/2021  · Medications  o phentermine 37.5 mg oral tablet   SIG: take 1 tablet (37.5 mg) by oral route once daily before breakfast for 30 days   DISP: (30) Tablet with 0 refills  Refilled on 04/01/2021     o Medications have been Reconciled  o Transition of Care or Provider Policy  · Instructions  o Obtained a written consent for FELIPE query. Discussed the risk and benefits of the use of controlled substances with the patient, including the risk of tolerance and drug dependence. The patient has been counseled on the need to have an exit strategy, including potentially discontinuing the use of controlled substances. FELIPE has or will be reviewed as soon as it becomes avaliable.  o Patient was educated/instructed on their diagnosis, treatment and medications prior to discharge from the clinic today.  o Patient counseled to reduce calorie intake.  o Patient was instructed to exercise regularly.  o Patient instructed to seek medical attention urgently for new or worsening symptoms.  · Disposition  o Call or Return if symptoms worsen or persist.  o Return Visit Request in/on 1 month +/- 2 weeks ([id number]).            Electronically Signed by: SERINA Smith -Author on April 1, 2021 08:08:54 AM

## 2021-05-15 VITALS
WEIGHT: 179.12 LBS | OXYGEN SATURATION: 100 % | SYSTOLIC BLOOD PRESSURE: 118 MMHG | HEART RATE: 73 BPM | BODY MASS INDEX: 27.15 KG/M2 | HEIGHT: 68 IN | DIASTOLIC BLOOD PRESSURE: 75 MMHG

## 2021-05-15 VITALS
SYSTOLIC BLOOD PRESSURE: 123 MMHG | HEIGHT: 68 IN | HEART RATE: 77 BPM | DIASTOLIC BLOOD PRESSURE: 87 MMHG | OXYGEN SATURATION: 100 % | WEIGHT: 176.25 LBS | BODY MASS INDEX: 26.71 KG/M2

## 2021-05-15 VITALS
BODY MASS INDEX: 26.88 KG/M2 | DIASTOLIC BLOOD PRESSURE: 72 MMHG | SYSTOLIC BLOOD PRESSURE: 114 MMHG | WEIGHT: 177.37 LBS | HEIGHT: 68 IN | OXYGEN SATURATION: 100 % | HEART RATE: 78 BPM

## 2021-05-15 VITALS
HEIGHT: 68 IN | BODY MASS INDEX: 26.69 KG/M2 | HEART RATE: 68 BPM | SYSTOLIC BLOOD PRESSURE: 119 MMHG | WEIGHT: 176.12 LBS | DIASTOLIC BLOOD PRESSURE: 77 MMHG | OXYGEN SATURATION: 100 %

## 2021-05-15 VITALS
BODY MASS INDEX: 26.52 KG/M2 | DIASTOLIC BLOOD PRESSURE: 77 MMHG | SYSTOLIC BLOOD PRESSURE: 111 MMHG | WEIGHT: 175 LBS | HEIGHT: 68 IN | OXYGEN SATURATION: 100 % | HEART RATE: 88 BPM

## 2021-05-15 VITALS
BODY MASS INDEX: 25.35 KG/M2 | DIASTOLIC BLOOD PRESSURE: 85 MMHG | WEIGHT: 167.25 LBS | HEIGHT: 68 IN | HEART RATE: 88 BPM | SYSTOLIC BLOOD PRESSURE: 132 MMHG | OXYGEN SATURATION: 98 %

## 2021-05-15 VITALS
WEIGHT: 175 LBS | BODY MASS INDEX: 26.52 KG/M2 | DIASTOLIC BLOOD PRESSURE: 78 MMHG | SYSTOLIC BLOOD PRESSURE: 115 MMHG | OXYGEN SATURATION: 100 % | HEIGHT: 68 IN | HEART RATE: 75 BPM

## 2021-05-15 VITALS
WEIGHT: 182.37 LBS | OXYGEN SATURATION: 100 % | SYSTOLIC BLOOD PRESSURE: 113 MMHG | BODY MASS INDEX: 27.64 KG/M2 | HEART RATE: 76 BPM | DIASTOLIC BLOOD PRESSURE: 77 MMHG | HEIGHT: 68 IN

## 2021-05-16 VITALS
HEIGHT: 68 IN | OXYGEN SATURATION: 100 % | WEIGHT: 174 LBS | SYSTOLIC BLOOD PRESSURE: 112 MMHG | BODY MASS INDEX: 26.37 KG/M2 | DIASTOLIC BLOOD PRESSURE: 76 MMHG | HEART RATE: 71 BPM

## 2021-05-16 VITALS
DIASTOLIC BLOOD PRESSURE: 84 MMHG | HEART RATE: 74 BPM | BODY MASS INDEX: 24.71 KG/M2 | WEIGHT: 163 LBS | OXYGEN SATURATION: 100 % | SYSTOLIC BLOOD PRESSURE: 125 MMHG | HEIGHT: 68 IN

## 2021-05-16 VITALS
WEIGHT: 168.12 LBS | HEIGHT: 68 IN | DIASTOLIC BLOOD PRESSURE: 83 MMHG | SYSTOLIC BLOOD PRESSURE: 116 MMHG | BODY MASS INDEX: 25.48 KG/M2 | HEART RATE: 67 BPM

## 2021-05-16 VITALS
SYSTOLIC BLOOD PRESSURE: 125 MMHG | HEIGHT: 68 IN | DIASTOLIC BLOOD PRESSURE: 84 MMHG | HEART RATE: 62 BPM | RESPIRATION RATE: 12 BRPM | WEIGHT: 161.37 LBS | BODY MASS INDEX: 24.46 KG/M2 | OXYGEN SATURATION: 100 % | TEMPERATURE: 97.9 F

## 2021-05-16 VITALS
HEIGHT: 68 IN | BODY MASS INDEX: 26.07 KG/M2 | DIASTOLIC BLOOD PRESSURE: 77 MMHG | HEART RATE: 62 BPM | WEIGHT: 172 LBS | SYSTOLIC BLOOD PRESSURE: 113 MMHG

## 2021-05-16 VITALS
DIASTOLIC BLOOD PRESSURE: 77 MMHG | BODY MASS INDEX: 24.74 KG/M2 | HEIGHT: 68 IN | SYSTOLIC BLOOD PRESSURE: 122 MMHG | HEART RATE: 71 BPM | WEIGHT: 163.25 LBS

## 2021-05-16 VITALS
BODY MASS INDEX: 24.86 KG/M2 | SYSTOLIC BLOOD PRESSURE: 120 MMHG | WEIGHT: 164 LBS | DIASTOLIC BLOOD PRESSURE: 87 MMHG | HEIGHT: 68 IN | HEART RATE: 72 BPM

## 2021-05-16 VITALS
BODY MASS INDEX: 24.89 KG/M2 | HEART RATE: 75 BPM | DIASTOLIC BLOOD PRESSURE: 78 MMHG | HEIGHT: 68 IN | WEIGHT: 164.25 LBS | SYSTOLIC BLOOD PRESSURE: 114 MMHG

## 2021-05-16 VITALS
DIASTOLIC BLOOD PRESSURE: 70 MMHG | WEIGHT: 166.12 LBS | TEMPERATURE: 98.1 F | BODY MASS INDEX: 25.18 KG/M2 | SYSTOLIC BLOOD PRESSURE: 117 MMHG | HEART RATE: 68 BPM | HEIGHT: 68 IN | OXYGEN SATURATION: 99 %

## 2021-06-02 ENCOUNTER — TELEMEDICINE CONVERTED (OUTPATIENT)
Dept: FAMILY MEDICINE CLINIC | Facility: CLINIC | Age: 36
End: 2021-06-02
Attending: NURSE PRACTITIONER

## 2021-06-05 NOTE — PROGRESS NOTES
Progress Note      Patient Name: Ofelia Barrera   Patient ID: [id number]   Sex: Female   Birthdate: Sue 10, 1985    Primary Care Provider: Jojo SMALLS   Referring Provider: Jojo SMALLS    Visit Date: June 2, 2021    Provider: SERINA Smith   [place] [facility]   [place] Address: [address] [place] [address], [address]  [place], [place]  [id number]   Location Phone: [phone]          Chief Complaint  · Phentermine #5      History Of Present Illness  Video Conferencing Visit  Ofelia Barrera is a 35 year old /White female who is presenting for evaluation via video conferencing via [employer] Verbal consent obtained before beginning visit.   The following staff were present during this visit: SERINA Smith and KRYSTIN Peace   Ofelia Barrera is a 35 year old /White female who presents for evaluation and treatment of:      Pt is a f/u for phentermine #5. Pt's start weight was 210 lbs. Pt reports weight this morning at 180 lbs. Total loss of 30 lbs. No issues or concerns at this time.       Past Medical History  Disease Name Date Onset Notes   Back Pain  --  --    Calculus Of Kidney --  --    Essential hypertension --  --    Lumbar disc w/o myelopathy 11/19/2013 --    Pap smear for cervical cancer screening 3/1/2019 EPW   Sciatica 11/19/2013 Recurrent symptoms in the left S1 dist.   Weight gain --  --          Past Surgical History  Procedure Name Date Notes   Lithotripsy --  --    Minimally invasive Discectomy 10/28/2013 --          Medication List  Name Date Started Instructions   hydrochlorothiazide 25 mg oral tablet 02/02/2021 TAKE ONE TABLET BY MOUTH DAILY   lisinopril 10 mg oral tablet 02/02/2021 TAKE ONE TABLET BY MOUTH DAILY   Mirena 20 mcg/24 hour (5 years) intrauterine intrauterine device  --    multivitamin oral tablet  take 1 tablet by oral route daily   phentermine 37.5 mg oral tablet 06/02/2021 take 1 tablet (37.5 mg) by oral route once daily  before breakfast for 30 days         Allergy List  Allergen Name Date Reaction Notes   NO KNOWN DRUG ALLERGIES --  --  --          Family Medical History  Disease Name Relative/Age Notes   Spine Problems  --          Reproductive History  Menstrual   Age Menarche: 11 Method of Birth Control: IUD   Pregnancy Summary   Total Pregnancies: 1 Full Term: 1 Premature: 0   Ab Induced: 0 Ab Spontaneous: 0 Ectopics: 0   Multiples: 0 Livin         Social History  Finding Status Start/Stop Quantity Notes   Alcohol Never --/-- --  2018 -     --  --/-- --  --    Moderate Amount of Exercise (1-3 times weekly) --  --/-- --  --    No known infection risk --  --/-- --  --    Tobacco Never --/-- --  --          Immunizations  NameDate Admin Mfg Trade Name Lot Number Route Inj VIS Given VIS Publication   COVID [id number] MOD Moderna COVID-19 Vaccine  NE NE     Comments:    COVID Sxpdujz49/15/2021 MOD Moderna COVID-19 Vaccine  NE NE     Comments:    Jryomnvtu59/2020 SKB Fluzone Quadrivalent  NE NE     Comments:    Tdap2016 SKB BOOSTRIX [id number] IM RA 2016   Comments:          Review of Systems  · Constitutional  o Admits  o : weight gain  o Denies  o : fever, fatigue, weight loss  · Cardiovascular  o Denies  o : lower extremity edema, claudication, chest pressure, palpitations  · Respiratory  o Denies  o : shortness of breath, wheezing, cough, hemoptysis, dyspnea on exertion  · Gastrointestinal  o Denies  o : nausea, vomiting, diarrhea, constipation, abdominal pain      Physical Examination  · Constitutional  o Appearance  o : well-nourished, well developed, alert, in no acute distress  · Neurologic  o Mental Status Examination  o :   § Orientation  § : grossly oriented to person, place and time  · Psychiatric  o Mood and Affect  o : mood normal, affect appropriate          Assessment  · Essential hypertension     401.9/I10  · Weight gain     783.1/R63.5      Plan  · Orders  o FELIPE Report  (KASPR) - - 06/02/2021  o ACO-39: Current medications updated and reviewed (1159F, ) - - 06/02/2021  · Medications  o phentermine 37.5 mg oral tablet   SIG: take 1 tablet (37.5 mg) by oral route once daily before breakfast for 30 days   DISP: (30) Tablet with 0 refills  Refilled on 06/02/2021     o Medications have been Reconciled  o Transition of Care or Provider Policy  · Instructions  o Patient advised to monitor blood pressure (B/P) at home and journal readings. Patient informed that a B/P reading at home of more than 130/80 is considered hypertension. For readings greater rzvo405/90 or higher patient is advised to follow up in the office with readings for management. Patient advised to limit sodium intake.  o Obtained a written consent for FELIPE query. Discussed the risk and benefits of the use of controlled substances with the patient, including the risk of tolerance and drug dependence. The patient has been counseled on the need to have an exit strategy, including potentially discontinuing the use of controlled substances. FELIPE has or will be reviewed as soon as it becomes avaliable.  o Patient was educated/instructed on their diagnosis, treatment and medications prior to discharge from the clinic today.  o Patient counseled to reduce calorie intake.  o Patient was instructed to exercise regularly.  o Patient instructed to seek medical attention urgently for new or worsening symptoms.  o Call the office with any concerns or questions.  · Disposition  o Call or Return if symptoms worsen or persist.  o Return Visit Request in/on 1 month +/- 2 weeks ([id number]).            Electronically Signed by: SERINA Smith -Author on June 2, 2021 07:48:37 AM

## 2021-07-02 PROBLEM — R63.5 WEIGHT GAIN: Status: ACTIVE | Noted: 2021-07-02

## 2021-07-02 PROBLEM — I10 ESSENTIAL HYPERTENSION: Status: ACTIVE | Noted: 2021-07-02

## 2021-07-02 RX ORDER — LISINOPRIL 10 MG/1
10 TABLET ORAL DAILY
COMMUNITY
End: 2021-08-02 | Stop reason: SDUPTHER

## 2021-07-02 RX ORDER — PHENTERMINE HYDROCHLORIDE 37.5 MG/1
37.5 CAPSULE ORAL EVERY MORNING
COMMUNITY
End: 2021-07-06 | Stop reason: SDUPTHER

## 2021-07-02 RX ORDER — HYDROCHLOROTHIAZIDE 25 MG/1
25 TABLET ORAL DAILY
COMMUNITY
End: 2021-08-02 | Stop reason: SDUPTHER

## 2021-07-06 ENCOUNTER — TELEMEDICINE (OUTPATIENT)
Dept: FAMILY MEDICINE CLINIC | Facility: CLINIC | Age: 36
End: 2021-07-06

## 2021-07-06 DIAGNOSIS — R63.5 WEIGHT GAIN: Primary | ICD-10-CM

## 2021-07-06 DIAGNOSIS — I10 ESSENTIAL HYPERTENSION: ICD-10-CM

## 2021-07-06 PROCEDURE — 99213 OFFICE O/P EST LOW 20 MIN: CPT | Performed by: NURSE PRACTITIONER

## 2021-07-06 RX ORDER — PHENTERMINE HYDROCHLORIDE 37.5 MG/1
37.5 CAPSULE ORAL EVERY MORNING
Qty: 30 CAPSULE | Refills: 0 | Status: SHIPPED | OUTPATIENT
Start: 2021-07-06 | End: 2021-08-02 | Stop reason: SDUPTHER

## 2021-07-06 NOTE — PROGRESS NOTES
You have chosen to receive care through a telehealth visit.  Do you consent to use a video/audio connection for your medical care today? YES     Chief Complaint  Abnormal Weight Gain (Phentermine #5)    Subjective            Ofelia Barrera presents to [facility]  Patient is needing refill #5 of phentermine. She was unable to take for the last 3 weeks. She went to have her medication refilled but the RX was lost during the transition from [place] to [facility], so she was unable to get filled and kept forgetting to get in contact with the office to have medication resent to pharmacy. Her starting weight was 210, current was 180 (same as last visit), total loss of 30lbs.     She has no other concerns to address.        PMH  Past Medical History:   Diagnosis Date   • Back pain    • Calculus of kidney    • Essential hypertension    • Lumbar disc displacement without myelopathy 11/19/2013   • Sciatica 11/19/2013    recurrent symptoms in the left S1 dist.   • Weight gain        ALLERGY  No Known Allergies     SURGICALHX  Past Surgical History:   Procedure Laterality Date   • KIDNEY STONE SURGERY      lithotripsy   • OTHER SURGICAL HISTORY  10/28/2013    discectomy        SOCX  Social History     Tobacco Use   • Smoking status: Never Smoker   • Smokeless tobacco: Never Used   Substance Use Topics   • Alcohol use: Never       FAMHX  Family History   Problem Relation Age of Onset   • Other Other         Spine problems        MEDSIGONLY  Current Outpatient Medications on File Prior to Visit   Medication Sig   • hydroCHLOROthiazide (HYDRODIURIL) 25 MG tablet Take 25 mg by mouth Daily.   • levonorgestrel (MIRENA) 20 MCG/24HR IUD 1 each by Intrauterine route 1 (One) Time.   • lisinopril (PRINIVIL,ZESTRIL) 10 MG tablet Take 10 mg by mouth Daily.   • Multiple Vitamins-Minerals (MULTIVITAMIN ADULT EXTRA C PO) Take  by mouth.   • [DISCONTINUED] phentermine 37.5 MG capsule Take 37.5 mg by mouth Every  Morning.     No current facility-administered medications on file prior to visit.       Health Maintenance Due   Topic Date Due   • ANNUAL PHYSICAL  Never done   • HEPATITIS C SCREENING  Never done   • PAP SMEAR  Never done       Objective     There were no vitals taken for this visit.      Physical Exam  Constitutional:       Appearance: Normal appearance.   Neurological:      Mental Status: She is alert.   Psychiatric:         Attention and Perception: Attention normal.         Mood and Affect: Mood normal.         Speech: Speech normal.         Behavior: Behavior normal.         Thought Content: Thought content normal.         Cognition and Memory: Cognition normal.         Judgment: Judgment normal.           Result Review :                           Assessment and Plan        Diagnoses and all orders for this visit:    1. Weight gain (Primary)  Comments:  pt to continue calorie counting and cardio exercise  Orders:  -     phentermine 37.5 MG capsule; Take 1 capsule by mouth Every Morning.  Dispense: 30 capsule; Refill: 0    2. Essential hypertension  Assessment & Plan:  Managed with current meds              Follow Up     Return in about 1 month (around 8/6/2021).    Patient was given instructions and counseling regarding her condition or for health maintenance advice. Please see specific information pulled into the AVS if appropriate.              SERINA Smith

## 2021-08-02 ENCOUNTER — TELEMEDICINE (OUTPATIENT)
Dept: FAMILY MEDICINE CLINIC | Facility: CLINIC | Age: 36
End: 2021-08-02

## 2021-08-02 DIAGNOSIS — I10 ESSENTIAL HYPERTENSION: ICD-10-CM

## 2021-08-02 DIAGNOSIS — R63.5 WEIGHT GAIN: Primary | ICD-10-CM

## 2021-08-02 PROCEDURE — 99213 OFFICE O/P EST LOW 20 MIN: CPT | Performed by: NURSE PRACTITIONER

## 2021-08-02 RX ORDER — PHENTERMINE HYDROCHLORIDE 37.5 MG/1
37.5 CAPSULE ORAL EVERY MORNING
Qty: 30 CAPSULE | Refills: 0 | Status: SHIPPED | OUTPATIENT
Start: 2021-08-02 | End: 2021-12-15

## 2021-08-02 RX ORDER — HYDROCHLOROTHIAZIDE 25 MG/1
25 TABLET ORAL DAILY
Qty: 90 TABLET | Refills: 1 | Status: SHIPPED | OUTPATIENT
Start: 2021-08-02 | End: 2022-02-02 | Stop reason: SDUPTHER

## 2021-08-02 RX ORDER — LISINOPRIL 10 MG/1
10 TABLET ORAL DAILY
Qty: 90 TABLET | Refills: 1 | Status: SHIPPED | OUTPATIENT
Start: 2021-08-02 | End: 2022-02-02 | Stop reason: SDUPTHER

## 2021-08-02 NOTE — PROGRESS NOTES
Chief Complaint  Weight Gain    Subjective            Ofelia Barrera presents to [facility]  Pt is a f/u for weight gain - phentermine #6. Pt start weight was 210 lbs. Pt reports weight today at 176 lbs. Pt has lost a total 34 lbs. No issues or concerns at this time. She also needs refill on her Lisinopril and HCTZ she is doing well on both medications and her BP has been WNL.        PMH  Past Medical History:   Diagnosis Date   • Back pain    • Calculus of kidney    • Essential hypertension    • Lumbar disc displacement without myelopathy 11/19/2013   • Sciatica 11/19/2013    recurrent symptoms in the left S1 dist.   • Weight gain        ALLERGY  No Known Allergies     SURGICALHX  Past Surgical History:   Procedure Laterality Date   • KIDNEY STONE SURGERY      lithotripsy   • OTHER SURGICAL HISTORY  10/28/2013    discectomy        SOCX  Social History     Tobacco Use   • Smoking status: Never Smoker   • Smokeless tobacco: Never Used   Substance Use Topics   • Alcohol use: Never       FAMHX  Family History   Problem Relation Age of Onset   • Other Other         Spine problems        MEDSIGONLY  Current Outpatient Medications on File Prior to Visit   Medication Sig   • levonorgestrel (MIRENA) 20 MCG/24HR IUD 1 each by Intrauterine route 1 (One) Time.   • Multiple Vitamins-Minerals (MULTIVITAMIN ADULT EXTRA C PO) Take  by mouth.   • [DISCONTINUED] hydroCHLOROthiazide (HYDRODIURIL) 25 MG tablet Take 25 mg by mouth Daily.   • [DISCONTINUED] lisinopril (PRINIVIL,ZESTRIL) 10 MG tablet Take 10 mg by mouth Daily.   • [DISCONTINUED] phentermine 37.5 MG capsule Take 1 capsule by mouth Every Morning.     No current facility-administered medications on file prior to visit.       Health Maintenance Due   Topic Date Due   • ANNUAL PHYSICAL  Never done   • HEPATITIS C SCREENING  Never done   • PAP SMEAR  Never done       Objective     There were no vitals taken for this visit.      Physical  Exam  Vitals and nursing note reviewed.   Constitutional:       Appearance: Normal appearance.   Neurological:      Mental Status: She is alert.   Psychiatric:         Attention and Perception: Attention normal.         Mood and Affect: Mood and affect normal.         Speech: Speech normal.         Behavior: Behavior normal. Behavior is cooperative.         Thought Content: Thought content normal.         Cognition and Memory: Cognition and memory normal.         Judgment: Judgment normal.           Result Review :                           Assessment and Plan        Diagnoses and all orders for this visit:    1. Weight gain (Primary)  -     phentermine 37.5 MG capsule; Take 1 capsule by mouth Every Morning.  Dispense: 30 capsule; Refill: 0    2. Essential hypertension  Comments:  continue low calorie diet and daily cardio  Orders:  -     hydroCHLOROthiazide (HYDRODIURIL) 25 MG tablet; Take 1 tablet by mouth Daily.  Dispense: 90 tablet; Refill: 1  -     lisinopril (PRINIVIL,ZESTRIL) 10 MG tablet; Take 1 tablet by mouth Daily.  Dispense: 90 tablet; Refill: 1              Follow Up     No follow-ups on file.    Patient was given instructions and counseling regarding her condition or for health maintenance advice. Please see specific information pulled into the AVS if appropriate.     Ofelia Barrera  reports that she has never smoked. She has never used smokeless tobacco.

## 2021-11-05 RX ORDER — METHYLPREDNISOLONE 4 MG/1
TABLET ORAL
Qty: 21 TABLET | Refills: 0 | Status: SHIPPED | OUTPATIENT
Start: 2021-11-05 | End: 2021-12-15

## 2021-11-29 ENCOUNTER — IMMUNIZATION (OUTPATIENT)
Dept: VACCINE CLINIC | Facility: HOSPITAL | Age: 36
End: 2021-11-29

## 2021-11-29 DIAGNOSIS — Z23 NEED FOR VACCINATION: Primary | ICD-10-CM

## 2021-11-29 PROCEDURE — 0064A HC ADM SARSCOV2 50MCG/0.25ML BOOSTER: CPT | Performed by: INTERNAL MEDICINE

## 2021-11-29 PROCEDURE — 91306 HC SARSCOV2 VAC 50MCG/0.25ML IM: CPT | Performed by: INTERNAL MEDICINE

## 2021-12-15 ENCOUNTER — TELEMEDICINE (OUTPATIENT)
Dept: FAMILY MEDICINE CLINIC | Facility: CLINIC | Age: 36
End: 2021-12-15

## 2021-12-15 DIAGNOSIS — L30.9 DERMATITIS: Primary | ICD-10-CM

## 2021-12-15 PROCEDURE — 99213 OFFICE O/P EST LOW 20 MIN: CPT | Performed by: NURSE PRACTITIONER

## 2021-12-15 RX ORDER — PREDNISONE 10 MG/1
TABLET ORAL
Qty: 42 TABLET | Refills: 0 | OUTPATIENT
Start: 2021-12-15 | End: 2021-12-26

## 2021-12-15 NOTE — PROGRESS NOTES
You have chosen to receive care through a telehealth visit.  Do you consent to use a video/audio connection for your medical care today? YES    Chief Complaint  Rash    Subjective            Ofelia Barrera presents to [facility]  Pt has c/o a rash that has appeared on her face and shoulders and arms and chest about a week after covid vaccine.  She has had no other changes in facial wash, creams, makeup, meds, etc.  The rash is periorbital and around her mouth, dry and scaley.          PMH  Past Medical History:   Diagnosis Date   • Back pain    • Calculus of kidney    • Essential hypertension    • Lumbar disc displacement without myelopathy 11/19/2013   • Sciatica 11/19/2013    recurrent symptoms in the left S1 dist.   • Weight gain        ALLERGY  No Known Allergies     SURGICALHX  Past Surgical History:   Procedure Laterality Date   • KIDNEY STONE SURGERY      lithotripsy   • OTHER SURGICAL HISTORY  10/28/2013    discectomy        SOCX  Social History     Tobacco Use   • Smoking status: Never Smoker   • Smokeless tobacco: Never Used   Substance Use Topics   • Alcohol use: Never       FAMHX  Family History   Problem Relation Age of Onset   • Other Other         Spine problems        MEDSIGONLY  Current Outpatient Medications on File Prior to Visit   Medication Sig   • hydroCHLOROthiazide (HYDRODIURIL) 25 MG tablet Take 1 tablet by mouth Daily.   • levonorgestrel (MIRENA) 20 MCG/24HR IUD 1 each by Intrauterine route 1 (One) Time.   • lisinopril (PRINIVIL,ZESTRIL) 10 MG tablet Take 1 tablet by mouth Daily.   • Multiple Vitamins-Minerals (MULTIVITAMIN ADULT EXTRA C PO) Take  by mouth.   • [DISCONTINUED] methylPREDNISolone (MEDROL) 4 MG dose pack Take as directed , dispense one pack   • [DISCONTINUED] phentermine 37.5 MG capsule Take 1 capsule by mouth Every Morning.     No current facility-administered medications on file prior to visit.       Health Maintenance Due   Topic Date Due    • ANNUAL PHYSICAL  Never done   • HEPATITIS C SCREENING  Never done   • PAP SMEAR  Never done   • INFLUENZA VACCINE  08/01/2021       Objective     There were no vitals taken for this visit.      Physical Exam  Skin:     Findings: Rash present.      Comments: Periorbital dry scale rash noted, similar rash noted below nose and around mouth.  PT states she has a pinpoint erythematous rash to arms and chest as well.   Psychiatric:         Attention and Perception: Attention normal.         Mood and Affect: Mood normal.         Speech: Speech normal.         Behavior: Behavior normal. Behavior is cooperative.         Thought Content: Thought content normal.         Judgment: Judgment normal.           Result Review :                           Assessment and Plan        Diagnoses and all orders for this visit:    1. Dermatitis (Primary)  Comments:  spoke with DOMINICK Cobian at derm office and she recommend streroid tx and f/u with her office in 2 weeks if no relief  Orders:  -     predniSONE (DELTASONE) 10 MG tablet; Take 40mg (4 tabs) q day for 7 days then 20mg (2 tabs) q day for 7 days  Dispense: 42 tablet; Refill: 0  -     hydrocortisone 2.5 % cream; Apply 1 application topically to the appropriate area as directed 3 (Three) Times a Day.  Dispense: 20 g; Refill: 0              Follow Up     Return if symptoms worsen or fail to improve.    Patient was given instructions and counseling regarding her condition or for health maintenance advice. Please see specific information pulled into the AVS if appropriate.            SERINA Smith

## 2021-12-21 ENCOUNTER — TELEPHONE (OUTPATIENT)
Dept: FAMILY MEDICINE CLINIC | Facility: CLINIC | Age: 36
End: 2021-12-21

## 2021-12-21 NOTE — TELEPHONE ENCOUNTER
If she feels it is fungal continue otc fungal cream it will be just as effective as prescription.  Not sure what else to apply to it until it is biopsied as it could make it worse.

## 2021-12-21 NOTE — TELEPHONE ENCOUNTER
Pt states she is set up to see derm on 1/12/22. She states this is the soonest she could get in. Pt feels it is fungal. Pt has been using OTC antifungal cream. She says this helps some. Pt states it is very red in color. Is there anything to help until dermatology appt.

## 2021-12-21 NOTE — TELEPHONE ENCOUNTER
Pt is req something for her facial rash that has not gotten any better since last visit on 12/15. Was prescribed hydrocortisone 2.5 % at thetime    Please advise

## 2021-12-21 NOTE — TELEPHONE ENCOUNTER
As advised in the note recommended to see derm if no improvement with the steroids and steroid cream, call and see if they can please work her in for us ask for aisha dinh as I spoke to her about her the day of the visit

## 2021-12-21 NOTE — TELEPHONE ENCOUNTER
Caller: Ofelia Barrera    Relationship: Self    Best call back number: [phone]    What is the best time to reach you: ANYTIME    Who are you requesting to speak with (clinical staff, provider,  specific staff member): NURSE OR SERINA LOWE     What was the call regarding: PATIENT IS CALLING STATING THAT THE RASH ON HER FACE HAS NOT GOTTEN ANY BETTER AND WANTED TO SEE IF YOU COULD CALL IN SOMETHING FOR A FUNGAL INFECTION.      Do you require a callback: YES

## 2022-01-13 ENCOUNTER — TRANSCRIBE ORDERS (OUTPATIENT)
Dept: ADMINISTRATIVE | Facility: HOSPITAL | Age: 37
End: 2022-01-13

## 2022-01-13 ENCOUNTER — LAB (OUTPATIENT)
Dept: LAB | Facility: HOSPITAL | Age: 37
End: 2022-01-13

## 2022-01-13 DIAGNOSIS — L40.0 PSORIASIS VULGARIS: ICD-10-CM

## 2022-01-13 DIAGNOSIS — L40.0 PSORIASIS VULGARIS: Primary | ICD-10-CM

## 2022-01-13 DIAGNOSIS — Z79.899 ENCOUNTER FOR LONG-TERM (CURRENT) USE OF OTHER MEDICATIONS: ICD-10-CM

## 2022-01-13 LAB
ALBUMIN SERPL-MCNC: 4.2 G/DL (ref 3.5–5.2)
ALBUMIN/GLOB SERPL: 1.3 G/DL
ALP SERPL-CCNC: 89 U/L (ref 39–117)
ALT SERPL W P-5'-P-CCNC: 14 U/L (ref 1–33)
ANION GAP SERPL CALCULATED.3IONS-SCNC: 8.2 MMOL/L (ref 5–15)
AST SERPL-CCNC: 21 U/L (ref 1–32)
BASOPHILS # BLD AUTO: 0.06 10*3/MM3 (ref 0–0.2)
BASOPHILS NFR BLD AUTO: 0.9 % (ref 0–1.5)
BILIRUB SERPL-MCNC: 0.2 MG/DL (ref 0–1.2)
BUN SERPL-MCNC: 6 MG/DL (ref 6–20)
BUN/CREAT SERPL: 7.1 (ref 7–25)
CALCIUM SPEC-SCNC: 8.9 MG/DL (ref 8.6–10.5)
CHLORIDE SERPL-SCNC: 102 MMOL/L (ref 98–107)
CO2 SERPL-SCNC: 28.8 MMOL/L (ref 22–29)
CREAT SERPL-MCNC: 0.85 MG/DL (ref 0.57–1)
DEPRECATED RDW RBC AUTO: 38.8 FL (ref 37–54)
EOSINOPHIL # BLD AUTO: 0.25 10*3/MM3 (ref 0–0.4)
EOSINOPHIL NFR BLD AUTO: 3.8 % (ref 0.3–6.2)
ERYTHROCYTE [DISTWIDTH] IN BLOOD BY AUTOMATED COUNT: 12.6 % (ref 12.3–15.4)
GFR SERPL CREATININE-BSD FRML MDRD: 75 ML/MIN/1.73
GLOBULIN UR ELPH-MCNC: 3.2 GM/DL
GLUCOSE SERPL-MCNC: 74 MG/DL (ref 65–99)
HCT VFR BLD AUTO: 35.6 % (ref 34–46.6)
HCV AB SER DONR QL: NORMAL
HGB BLD-MCNC: 11.6 G/DL (ref 12–15.9)
IMM GRANULOCYTES # BLD AUTO: 0.02 10*3/MM3 (ref 0–0.05)
IMM GRANULOCYTES NFR BLD AUTO: 0.3 % (ref 0–0.5)
LYMPHOCYTES # BLD AUTO: 1.79 10*3/MM3 (ref 0.7–3.1)
LYMPHOCYTES NFR BLD AUTO: 26.9 % (ref 19.6–45.3)
MCH RBC QN AUTO: 27.6 PG (ref 26.6–33)
MCHC RBC AUTO-ENTMCNC: 32.6 G/DL (ref 31.5–35.7)
MCV RBC AUTO: 84.8 FL (ref 79–97)
MONOCYTES # BLD AUTO: 0.39 10*3/MM3 (ref 0.1–0.9)
MONOCYTES NFR BLD AUTO: 5.9 % (ref 5–12)
NEUTROPHILS NFR BLD AUTO: 4.15 10*3/MM3 (ref 1.7–7)
NEUTROPHILS NFR BLD AUTO: 62.2 % (ref 42.7–76)
NRBC BLD AUTO-RTO: 0 /100 WBC (ref 0–0.2)
PLATELET # BLD AUTO: 399 10*3/MM3 (ref 140–450)
PMV BLD AUTO: 10.6 FL (ref 6–12)
POTASSIUM SERPL-SCNC: 3.2 MMOL/L (ref 3.5–5.2)
PROT SERPL-MCNC: 7.4 G/DL (ref 6–8.5)
RBC # BLD AUTO: 4.2 10*6/MM3 (ref 3.77–5.28)
SODIUM SERPL-SCNC: 139 MMOL/L (ref 136–145)
WBC NRBC COR # BLD: 6.66 10*3/MM3 (ref 3.4–10.8)

## 2022-01-13 PROCEDURE — 86480 TB TEST CELL IMMUN MEASURE: CPT

## 2022-01-13 PROCEDURE — 86706 HEP B SURFACE ANTIBODY: CPT

## 2022-01-13 PROCEDURE — 80053 COMPREHEN METABOLIC PANEL: CPT

## 2022-01-13 PROCEDURE — 80074 ACUTE HEPATITIS PANEL: CPT

## 2022-01-13 PROCEDURE — 86704 HEP B CORE ANTIBODY TOTAL: CPT

## 2022-01-13 PROCEDURE — 85025 COMPLETE CBC W/AUTO DIFF WBC: CPT

## 2022-01-13 PROCEDURE — 36415 COLL VENOUS BLD VENIPUNCTURE: CPT

## 2022-01-14 LAB
HAV AB SER QL IA: POSITIVE
HAV IGM SERPL QL IA: NEGATIVE
HBV CORE AB SERPL QL IA: NEGATIVE
HBV CORE IGM SERPL QL IA: NEGATIVE
HBV SURFACE AB SER QL: REACTIVE
HBV SURFACE AG SERPL QL IA: NEGATIVE

## 2022-01-15 LAB
GAMMA INTERFERON BACKGROUND BLD IA-ACNC: 0.01 IU/ML
M TB IFN-G BLD-IMP: NEGATIVE
M TB IFN-G CD4+ BCKGRND COR BLD-ACNC: 0.01 IU/ML
M TB IFN-G CD4+CD8+ BCKGRND COR BLD-ACNC: 0.01 IU/ML
MITOGEN IGNF BLD-ACNC: >10 IU/ML
QUANTIFERON INCUBATION: NORMAL
SERVICE CMNT-IMP: NORMAL

## 2022-01-31 ENCOUNTER — LAB (OUTPATIENT)
Dept: LAB | Facility: HOSPITAL | Age: 37
End: 2022-01-31

## 2022-01-31 ENCOUNTER — TRANSCRIBE ORDERS (OUTPATIENT)
Dept: LAB | Facility: HOSPITAL | Age: 37
End: 2022-01-31

## 2022-01-31 DIAGNOSIS — J44.0 OBSTRUCTIVE CHRONIC BRONCHITIS WITH ACUTE BRONCHITIS: ICD-10-CM

## 2022-01-31 DIAGNOSIS — Z79.899 ENCOUNTER FOR LONG-TERM (CURRENT) USE OF OTHER MEDICATIONS: ICD-10-CM

## 2022-01-31 DIAGNOSIS — L30.9 ACUTE DERMATITIS: ICD-10-CM

## 2022-01-31 DIAGNOSIS — J44.0 OBSTRUCTIVE CHRONIC BRONCHITIS WITH ACUTE BRONCHITIS: Primary | ICD-10-CM

## 2022-01-31 LAB
ALBUMIN SERPL-MCNC: 4.4 G/DL (ref 3.5–5.2)
ALBUMIN/GLOB SERPL: 1.5 G/DL
ALP SERPL-CCNC: 101 U/L (ref 39–117)
ALT SERPL W P-5'-P-CCNC: 121 U/L (ref 1–33)
ANION GAP SERPL CALCULATED.3IONS-SCNC: 8.5 MMOL/L (ref 5–15)
AST SERPL-CCNC: 45 U/L (ref 1–32)
BASOPHILS # BLD AUTO: 0.07 10*3/MM3 (ref 0–0.2)
BASOPHILS NFR BLD AUTO: 0.8 % (ref 0–1.5)
BILIRUB SERPL-MCNC: 0.3 MG/DL (ref 0–1.2)
BUN SERPL-MCNC: 7 MG/DL (ref 6–20)
BUN/CREAT SERPL: 8.5 (ref 7–25)
CALCIUM SPEC-SCNC: 9.3 MG/DL (ref 8.6–10.5)
CHLORIDE SERPL-SCNC: 99 MMOL/L (ref 98–107)
CO2 SERPL-SCNC: 29.5 MMOL/L (ref 22–29)
CREAT SERPL-MCNC: 0.82 MG/DL (ref 0.57–1)
DEPRECATED RDW RBC AUTO: 39.6 FL (ref 37–54)
EOSINOPHIL # BLD AUTO: 0.27 10*3/MM3 (ref 0–0.4)
EOSINOPHIL NFR BLD AUTO: 3 % (ref 0.3–6.2)
ERYTHROCYTE [DISTWIDTH] IN BLOOD BY AUTOMATED COUNT: 12.9 % (ref 12.3–15.4)
GFR SERPL CREATININE-BSD FRML MDRD: 78 ML/MIN/1.73
GLOBULIN UR ELPH-MCNC: 3 GM/DL
GLUCOSE SERPL-MCNC: 74 MG/DL (ref 65–99)
HCT VFR BLD AUTO: 36.2 % (ref 34–46.6)
HGB BLD-MCNC: 11.6 G/DL (ref 12–15.9)
IMM GRANULOCYTES # BLD AUTO: 0.04 10*3/MM3 (ref 0–0.05)
IMM GRANULOCYTES NFR BLD AUTO: 0.4 % (ref 0–0.5)
LYMPHOCYTES # BLD AUTO: 1.99 10*3/MM3 (ref 0.7–3.1)
LYMPHOCYTES NFR BLD AUTO: 22.3 % (ref 19.6–45.3)
MCH RBC QN AUTO: 27 PG (ref 26.6–33)
MCHC RBC AUTO-ENTMCNC: 32 G/DL (ref 31.5–35.7)
MCV RBC AUTO: 84.4 FL (ref 79–97)
MONOCYTES # BLD AUTO: 0.67 10*3/MM3 (ref 0.1–0.9)
MONOCYTES NFR BLD AUTO: 7.5 % (ref 5–12)
NEUTROPHILS NFR BLD AUTO: 5.87 10*3/MM3 (ref 1.7–7)
NEUTROPHILS NFR BLD AUTO: 66 % (ref 42.7–76)
NRBC BLD AUTO-RTO: 0 /100 WBC (ref 0–0.2)
PLATELET # BLD AUTO: 370 10*3/MM3 (ref 140–450)
PMV BLD AUTO: 9.9 FL (ref 6–12)
POTASSIUM SERPL-SCNC: 3.2 MMOL/L (ref 3.5–5.2)
PROT SERPL-MCNC: 7.4 G/DL (ref 6–8.5)
RBC # BLD AUTO: 4.29 10*6/MM3 (ref 3.77–5.28)
SODIUM SERPL-SCNC: 137 MMOL/L (ref 136–145)
WBC NRBC COR # BLD: 8.91 10*3/MM3 (ref 3.4–10.8)

## 2022-01-31 PROCEDURE — 36415 COLL VENOUS BLD VENIPUNCTURE: CPT

## 2022-01-31 PROCEDURE — 85025 COMPLETE CBC W/AUTO DIFF WBC: CPT

## 2022-01-31 PROCEDURE — 80053 COMPREHEN METABOLIC PANEL: CPT

## 2022-02-02 ENCOUNTER — TELEMEDICINE (OUTPATIENT)
Dept: FAMILY MEDICINE CLINIC | Facility: CLINIC | Age: 37
End: 2022-02-02

## 2022-02-02 VITALS — WEIGHT: 191 LBS | BODY MASS INDEX: 28.95 KG/M2 | HEIGHT: 68 IN

## 2022-02-02 DIAGNOSIS — Z00.00 ANNUAL PHYSICAL EXAM: Primary | ICD-10-CM

## 2022-02-02 DIAGNOSIS — I10 ESSENTIAL HYPERTENSION: ICD-10-CM

## 2022-02-02 DIAGNOSIS — Z13.29 SCREENING FOR THYROID DISORDER: ICD-10-CM

## 2022-02-02 PROCEDURE — 99395 PREV VISIT EST AGE 18-39: CPT | Performed by: NURSE PRACTITIONER

## 2022-02-02 RX ORDER — LISINOPRIL 10 MG/1
10 TABLET ORAL DAILY
Qty: 90 TABLET | Refills: 1 | Status: SHIPPED | OUTPATIENT
Start: 2022-02-02 | End: 2022-08-29 | Stop reason: SDUPTHER

## 2022-02-02 RX ORDER — HYDROCHLOROTHIAZIDE 25 MG/1
25 TABLET ORAL DAILY
Qty: 90 TABLET | Refills: 1 | Status: SHIPPED | OUTPATIENT
Start: 2022-02-02 | End: 2022-08-29 | Stop reason: SDUPTHER

## 2022-02-02 NOTE — PROGRESS NOTES
You have chosen to receive care through a telehealth visit.  Do you consent to use a video/audio connection for your medical care today? Yes.       Chief Complaint  Hypertension and Annual Exam    Subjective            Ofelia Barrera presents to [facility]  Pt doing Tele Health today for annual exam.    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    Pt due pap and will schedule with EPW    Pt due labs.    Pt request refills sent to [facility]. Pt takes Lisinopril and hydrochlorothiazide for HTN.         PMH  Past Medical History:   Diagnosis Date   • Back pain    • Calculus of kidney    • Essential hypertension    • Lumbar disc displacement without myelopathy 11/19/2013   • Sciatica 11/19/2013    recurrent symptoms in the left S1 dist.   • Weight gain        ALLERGY  No Known Allergies     SURGICALHX  Past Surgical History:   Procedure Laterality Date   • KIDNEY STONE SURGERY      lithotripsy   • OTHER SURGICAL HISTORY  10/28/2013    discectomy        SOCX  Social History     Tobacco Use   • Smoking status: Never Smoker   • Smokeless tobacco: Never Used   Substance Use Topics   • Alcohol use: Never       FAMHX  Family History   Problem Relation Age of Onset   • Other Other         Spine problems        MEDSIGONLY  Current Outpatient Medications on File Prior to Visit   Medication Sig   • clobetasol (TEMOVATE) 0.05 % ointment APPLY TO RASH ON TRUNK, ARMS, AND LEGS twice daily as needed   • folic acid (FOLVITE) 1 MG tablet take one tablet by mouth once daily except day that methotrexate is taken   • hydrocortisone 2.5 % ointment Apply to psoriasis on face twice daily as needed for flares only   • hydrOXYzine (ATARAX) 25 MG tablet take tablet every 6 hours for itching   • levonorgestrel (MIRENA) 20 MCG/24HR IUD 1 each by Intrauterine route 1 (One) Time.   • methotrexate 2.5 MG tablet take 5 tablets by  "mouth once weekly   • Multiple Vitamins-Minerals (MULTIVITAMIN ADULT EXTRA C PO) Take  by mouth.   • terbinafine (lamISIL) 1 % cream Apply 1 application topically to the appropriate area as directed 2 (Two) Times a Day.   • triamcinolone (KENALOG) 0.1 % ointment Apply to psoriasis on trunk, arms, and legs twice daily as needed for itching   • [DISCONTINUED] hydroCHLOROthiazide (HYDRODIURIL) 25 MG tablet Take 1 tablet by mouth Daily.   • [DISCONTINUED] lisinopril (PRINIVIL,ZESTRIL) 10 MG tablet Take 1 tablet by mouth Daily.     No current facility-administered medications on file prior to visit.       Health Maintenance Due   Topic Date Due   • ANNUAL PHYSICAL  Never done   • PAP SMEAR  Never done       Objective     Ht 172.7 cm (68\")   Wt 86.6 kg (191 lb)   BMI 29.04 kg/m²       Physical Exam  Psychiatric:         Attention and Perception: Attention normal.         Mood and Affect: Mood normal.         Speech: Speech normal.         Behavior: Behavior normal. Behavior is cooperative.         Thought Content: Thought content normal.         Cognition and Memory: Cognition normal.         Judgment: Judgment normal.           Result Review :                           Assessment and Plan        Diagnoses and all orders for this visit:    1. Annual physical exam (Primary)  -     TSH; Future  -     Lipid panel; Future  -     T4, free; Future    2. Essential hypertension  Comments:  stable on lisinopril 10mg and hctz 25mg, continue  Orders:  -     hydroCHLOROthiazide (HYDRODIURIL) 25 MG tablet; Take 1 tablet by mouth Daily.  Dispense: 90 tablet; Refill: 1  -     lisinopril (PRINIVIL,ZESTRIL) 10 MG tablet; Take 1 tablet by mouth Daily.  Dispense: 90 tablet; Refill: 1  -     Lipid panel; Future    3. Screening for thyroid disorder  -     TSH; Future  -     T4, free; Future      Preventative Counseling:  Avoid alcohol and illegal drugs  Get adequate sleep  Healthy diet and daily exercise        Follow Up     Return in about " 6 months (around 8/2/2022).    Patient was given instructions and counseling regarding her condition or for health maintenance advice. Please see specific information pulled into the AVS if appropriate.     Ofelia Barrera  reports that she has never smoked. She has never used smokeless tobacco.

## 2022-02-17 ENCOUNTER — SPECIALTY PHARMACY (OUTPATIENT)
Dept: PHARMACY | Facility: TELEHEALTH | Age: 37
End: 2022-02-17

## 2022-02-17 NOTE — PROGRESS NOTES
[facility]    Specialty Pharmacy Refill Coordination Note     Ofelia is a 36 y.o. female contacted today regarding refills of  [facility] specialty medication(s).    Reviewed and verified with patient:       Specialty medication(s) and dose(s) confirmed: yes                   Follow-up: 21 day(s)     Vira Andrade, Pharmacy Technician  Specialty Pharmacy Technician

## 2022-02-17 NOTE — PROGRESS NOTES
Specialty Pharmacy Patient Management Program  Initial Assessment     Ofelia Barrera is a 36 y.o. female with severe plaque psoriasis and enrolled in the Patient Management program offered by [facility].  An initial outreach was conducted, including assessment of therapy appropriateness and specialty medication education for Cosentyx 150mg/ml . The patient was introduced to services offered by [facility], including: regular assessments, refill coordination, curbside pick-up or mail order delivery options, prior authorization maintenance, and financial assistance programs as applicable. The patient was also provided with contact information for the pharmacy team.     Insurance Coverage & Financial Support  [employer] [facility] Cosentyx patient savings program     Relevant Past Medical History and Comorbidities  Relevant medical history and concomitant health conditions were discussed with the patient. The patient's chart has been reviewed for relevant past medical history and comorbid health conditions and updated as necessary.   Past Medical History:   Diagnosis Date   • Back pain    • Calculus of kidney    • Essential hypertension    • Lumbar disc displacement without myelopathy 11/19/2013   • Sciatica 11/19/2013    recurrent symptoms in the left S1 dist.   • Weight gain      Social History     Socioeconomic History   • Marital status:    Tobacco Use   • Smoking status: Never Smoker   • Smokeless tobacco: Never Used   Vaping Use   • Vaping Use: Never used   Substance and Sexual Activity   • Alcohol use: Never   • Drug use: Never   • Sexual activity: Defer       Allergies  Known allergies and reactions were discussed with the patient. The patient's chart has been reviewed for  allergy information and updated as necessary.   Patient has no known allergies.    Current Medication List  This medication list has been reviewed with the patient and evaluated for any  interactions or necessary modifications/recommendations, and updated to include all prescription medications, OTC medications, and supplements the patient is currently taking.  This list reflects what is contained in the patient's profile, which has also been marked as reviewed to communicate to other providers it is the most up to date version of the patient's current medication therapy.     Current Outpatient Medications:   •  clobetasol (TEMOVATE) 0.05 % ointment, APPLY TO RASH ON TRUNK, ARMS, AND LEGS twice daily as needed, Disp: 60 g, Rfl: 1  •  hydroCHLOROthiazide (HYDRODIURIL) 25 MG tablet, Take 1 tablet by mouth Daily., Disp: 90 tablet, Rfl: 1  •  hydrOXYzine (ATARAX) 25 MG tablet, take tablet every 6 hours for itching, Disp: 120 tablet, Rfl: 1  •  levonorgestrel (MIRENA) 20 MCG/24HR IUD, 1 each by Intrauterine route 1 (One) Time., Disp: , Rfl:   •  lisinopril (PRINIVIL,ZESTRIL) 10 MG tablet, Take 1 tablet by mouth Daily., Disp: 90 tablet, Rfl: 1  •  Multiple Vitamins-Minerals (MULTIVITAMIN ADULT EXTRA C PO), Take  by mouth., Disp: , Rfl:   •  Secukinumab, 300 MG Dose, (Cosentyx Sensoready, 300 MG,) 150 MG/ML solution auto-injector, Inject 2 mL under the skin into the appropriate Every 28 (Twenty-Eight) Days., Disp: 2 mL, Rfl: 3  •  Secukinumab, 300 MG Dose, 150 MG/ML solution auto-injector, Inject 300mg (2 pens) under the skin into the appropriate area as directed 1 (One) Time Per Week for 5 weeks., Disp: 10 mL, Rfl: 0  •  folic acid (FOLVITE) 1 MG tablet, take one tablet by mouth once daily except day that methotrexate is taken, Disp: 30 tablet, Rfl: 6  •  hydrocortisone 2.5 % ointment, Apply to psoriasis on face twice daily as needed for flares only, Disp: 28.35 g, Rfl: 1  •  methotrexate 2.5 MG tablet, take 5 tablets by mouth once weekly, Disp: 20 tablet, Rfl: 1  •  terbinafine (lamISIL) 1 % cream, Apply 1 application topically to the appropriate area as directed 2 (Two) Times a Day., Disp: 42 g, Rfl:  1  •  triamcinolone (KENALOG) 0.1 % ointment, Apply to psoriasis on trunk, arms, and legs twice daily as needed for itching, Disp: 454 g, Rfl: 1    Drug Interactions  none     Relevant Laboratory Values  No results for input(s): CMP, BMP, CBC in the last 72 hours.    Initial Education Provided for Specialty Medication  The patient has been provided with the following education and any applicable administration techniques (i.e. self-injection) have been demonstrated for the therapies indicated. All questions and concerns have been addressed prior to the patient receiving the medication, and the patient has verbalized understanding of the education and any materials provided.  Additional patient education shall be provided and documented upon request by the patient, provider or payer.            Cosentyx (secukinumab)           Medication Expectations   Why am I taking this medication? You are taking this medication for plaque psoriasis, psoriatic arthritis, ankylosing spondylitis, or nonradiographic axial spondyloarthritis.   What should I expect while on this medication? You should expect a decrease in the frequency and severity of symptoms.   How does the medication work? Secukinumab is a human IgG1 monoclonal antibody that binds to interleukin-17A cytokine therefore inhibiting its interaction with IL-17 receptor. This inhibits the release of proinflammatory cytokines and chemokines.   How long will I be on this medication for? The amount of time you will be on this medication will be determined by your doctor and your response to the medication.    How do I take this medication? Take as directed on your prescription label.  This medication is a subcutaneous injection given in the fatty part of the skin on the top of the thigh, upper outer arm, or stomach area. Allow to reach room temperature prior to injection.   What are some possible side effects? Injection site reactions and hypersensitivity reactions,  headache, diarrhea, signs of a common cold, stomach pain, or upset stomach.   What happens if I miss a dose? If you miss a dose, take it as soon as you remember. If it is close to the time for your next dose, skip the missed dose and go back to your normal time.                    Medication Safety   What are things I should warn my doctor immediately about? Allergic reaction such has hives or trouble breathing. If you develop symptoms of infection such as a cough that does not go away, weight loss, changes in how often you urinate, changes in sweating, muscle pain or weakness, or severe dizziness.     What are things that I should be cautious of? Injection site reaction and headache. You may have more chance of getting an infection.  Wash your hands often and stay away from people with infections, colds, or flu.   What are some medications that can interact with this one? Immunosuppressants and vaccines.            Medication Storage/Handling   How should I handle this medication? Keep this medication our of reach of pets/children in original container.  Store in the original container to protect from light. Do not freeze or shake. Do not inject where the skin is tender, bruised, red, hard, affected by psoriasis, or where there are scars or stretch marks.  Rotate injection sites.   How does this medication need to be stored? Store in refrigerator and keep dry. If needed, you may store at room temperature for up to 4 days and may return to the refrigerator if unused.    How should I dispose of this medication? You can dispose of the syringe in a sharps container, and if this is not available you may use an empty hard plastic container such as a milk jug or tide container. Discard any unused portion or if stored outside of refrigerator > 4 days.            Resources/Support   How can I remind myself to take this medication? You can download a reminder jimmy on your phone or use a calandar  to help with your injection.    Is financial support available?  Yes, Cosenty[facility] can provide co-pay assisstance if you have commercial insurance or patient assistance if you have Medicare or no insurance.    Which vaccines are recommended for me? Talk to your doctor about these vaccines: Flu, Coronavirus (COVID-19), Pneumococcal (pneumonia), Tdap, Hepatitis B, Zoster (shingles).                Adherence and Self-Administration  • Barriers to Patient Adherence and/or Self-Administration: patient has a nursing background.  Did go over injection sites.   • Methods for Supporting Patient Adherence and/or Self-Administration: We will reach out to patient in 3 weeks to complete refill for loading dose.  Then again in 5 weeks to set her up for maintenance dose.    Goals of Therapy  • Patient Goals of Therapy: Psoriasis currently covers 95% of her body, goal is to see an improvement.   • Clinical Goals or Therapeutic Targets, If Applicable: Guide patient through the loading dose for the first 5 weeks.  Currently is covered in 95% of her body.  Optimal goal is to lessen than as well as decrease pain and itching side effects.     Reassessment Plan & Follow-Up  1. Medication Therapy Changes: none  2. Additional Plans, Therapy Recommendations, or Therapy Problems to Be Addressed: none at this time.  3. Will follow up with patient at week 4 and week 7 of therapy.   4. Pharmacist to perform regular reassessments no more than (6) months from the previous assessment.  5. Welcome information and patient satisfaction survey to be sent by retail team with patient's initial fill.  6. Care Coordinator to set up future refill outreaches, coordinate prescription delivery, and escalate clinical questions to pharmacist.     Attestation  I attest that the initiated specialty medication(s) are appropriate for the patient based on my assessment.  If the prescribed therapy is at any point deemed not appropriate based on the current or future assessments, a  consultation will be initiated with the patient's specialty care provider to determine the best course of action. The revised plan of therapy will be documented along with any additional patient education provided.     Electronically signed by Malick Guillermo RPH, 02/17/22, 10:21 AM EST.

## 2022-02-18 ENCOUNTER — TRANSCRIBE ORDERS (OUTPATIENT)
Dept: ADMINISTRATIVE | Facility: HOSPITAL | Age: 37
End: 2022-02-18

## 2022-02-18 ENCOUNTER — LAB (OUTPATIENT)
Dept: LAB | Facility: HOSPITAL | Age: 37
End: 2022-02-18

## 2022-02-18 DIAGNOSIS — L40.0 PLAQUE PSORIASIS: ICD-10-CM

## 2022-02-18 DIAGNOSIS — Z79.899 ENCOUNTER FOR LONG-TERM (CURRENT) USE OF OTHER MEDICATIONS: ICD-10-CM

## 2022-02-18 DIAGNOSIS — Z13.29 SCREENING FOR THYROID DISORDER: ICD-10-CM

## 2022-02-18 DIAGNOSIS — Z00.00 ANNUAL PHYSICAL EXAM: ICD-10-CM

## 2022-02-18 DIAGNOSIS — Z79.899 ENCOUNTER FOR LONG-TERM (CURRENT) USE OF OTHER MEDICATIONS: Primary | ICD-10-CM

## 2022-02-18 DIAGNOSIS — I10 ESSENTIAL HYPERTENSION: ICD-10-CM

## 2022-02-18 LAB
ALBUMIN SERPL-MCNC: 4.4 G/DL (ref 3.5–5.2)
ALBUMIN/GLOB SERPL: 1.3 G/DL
ALP SERPL-CCNC: 89 U/L (ref 39–117)
ALT SERPL W P-5'-P-CCNC: 30 U/L (ref 1–33)
ANION GAP SERPL CALCULATED.3IONS-SCNC: 9 MMOL/L (ref 5–15)
AST SERPL-CCNC: 21 U/L (ref 1–32)
BASOPHILS # BLD AUTO: 0.1 10*3/MM3 (ref 0–0.2)
BASOPHILS NFR BLD AUTO: 1.1 % (ref 0–1.5)
BILIRUB SERPL-MCNC: 0.2 MG/DL (ref 0–1.2)
BUN SERPL-MCNC: 8 MG/DL (ref 6–20)
BUN/CREAT SERPL: 9.5 (ref 7–25)
CALCIUM SPEC-SCNC: 8.8 MG/DL (ref 8.6–10.5)
CHLORIDE SERPL-SCNC: 100 MMOL/L (ref 98–107)
CHOLEST SERPL-MCNC: 206 MG/DL (ref 0–200)
CO2 SERPL-SCNC: 29 MMOL/L (ref 22–29)
CREAT SERPL-MCNC: 0.84 MG/DL (ref 0.57–1)
DEPRECATED RDW RBC AUTO: 39.1 FL (ref 37–54)
EOSINOPHIL # BLD AUTO: 0.18 10*3/MM3 (ref 0–0.4)
EOSINOPHIL NFR BLD AUTO: 2 % (ref 0.3–6.2)
ERYTHROCYTE [DISTWIDTH] IN BLOOD BY AUTOMATED COUNT: 13.4 % (ref 12.3–15.4)
GFR SERPL CREATININE-BSD FRML MDRD: 76 ML/MIN/1.73
GLOBULIN UR ELPH-MCNC: 3.4 GM/DL
GLUCOSE SERPL-MCNC: 77 MG/DL (ref 65–99)
HCT VFR BLD AUTO: 36.4 % (ref 34–46.6)
HDLC SERPL-MCNC: 49 MG/DL (ref 40–60)
HGB BLD-MCNC: 12.2 G/DL (ref 12–15.9)
IMM GRANULOCYTES # BLD AUTO: 0.03 10*3/MM3 (ref 0–0.05)
IMM GRANULOCYTES NFR BLD AUTO: 0.3 % (ref 0–0.5)
LDLC SERPL CALC-MCNC: 146 MG/DL (ref 0–100)
LDLC/HDLC SERPL: 2.95 {RATIO}
LYMPHOCYTES # BLD AUTO: 2.25 10*3/MM3 (ref 0.7–3.1)
LYMPHOCYTES NFR BLD AUTO: 25 % (ref 19.6–45.3)
MCH RBC QN AUTO: 27.6 PG (ref 26.6–33)
MCHC RBC AUTO-ENTMCNC: 33.5 G/DL (ref 31.5–35.7)
MCV RBC AUTO: 82.4 FL (ref 79–97)
MONOCYTES # BLD AUTO: 0.68 10*3/MM3 (ref 0.1–0.9)
MONOCYTES NFR BLD AUTO: 7.5 % (ref 5–12)
NEUTROPHILS NFR BLD AUTO: 5.77 10*3/MM3 (ref 1.7–7)
NEUTROPHILS NFR BLD AUTO: 64.1 % (ref 42.7–76)
NRBC BLD AUTO-RTO: 0 /100 WBC (ref 0–0.2)
PLATELET # BLD AUTO: 403 10*3/MM3 (ref 140–450)
PMV BLD AUTO: 10.6 FL (ref 6–12)
POTASSIUM SERPL-SCNC: 3.1 MMOL/L (ref 3.5–5.2)
PROT SERPL-MCNC: 7.8 G/DL (ref 6–8.5)
RBC # BLD AUTO: 4.42 10*6/MM3 (ref 3.77–5.28)
SODIUM SERPL-SCNC: 138 MMOL/L (ref 136–145)
T PALLIDUM IGG SER QL: NORMAL
T4 FREE SERPL-MCNC: 0.93 NG/DL (ref 0.93–1.7)
TRIGL SERPL-MCNC: 62 MG/DL (ref 0–150)
TSH SERPL DL<=0.05 MIU/L-ACNC: 1.85 UIU/ML (ref 0.27–4.2)
VLDLC SERPL-MCNC: 11 MG/DL (ref 5–40)
WBC NRBC COR # BLD: 9.01 10*3/MM3 (ref 3.4–10.8)

## 2022-02-18 PROCEDURE — 80050 GENERAL HEALTH PANEL: CPT

## 2022-02-18 PROCEDURE — 80061 LIPID PANEL: CPT

## 2022-02-18 PROCEDURE — 36415 COLL VENOUS BLD VENIPUNCTURE: CPT

## 2022-02-18 PROCEDURE — 84439 ASSAY OF FREE THYROXINE: CPT

## 2022-02-18 PROCEDURE — 86780 TREPONEMA PALLIDUM: CPT

## 2022-02-21 ENCOUNTER — TELEPHONE (OUTPATIENT)
Dept: FAMILY MEDICINE CLINIC | Facility: CLINIC | Age: 37
End: 2022-02-21

## 2022-02-21 NOTE — TELEPHONE ENCOUNTER
----- Message from SERINA Smith sent at 2/20/2022  9:22 AM EST -----  Chol is elevated from last check, encourage low chol diet and daily cardio recheck in 6 months

## 2022-03-14 ENCOUNTER — SPECIALTY PHARMACY (OUTPATIENT)
Dept: PHARMACY | Facility: TELEHEALTH | Age: 37
End: 2022-03-14

## 2022-03-14 PROBLEM — L40.0 PSORIASIS VULGARIS: Status: ACTIVE | Noted: 2022-03-14

## 2022-03-14 NOTE — PROGRESS NOTES
Specialty Pharmacy Refill Coordination Note     Ofelia is a 36 y.o. female contacted today regarding refills of  Cosentyx specialty medication(s).    Reviewed and verified with patient:         Specialty medication(s) and dose(s) confirmed: yes    Refill Questions    Flowsheet Row Most Recent Value   Changes to allergies? No   Changes to medications? No   New conditions since last clinic visit No   Unplanned office visit, urgent care, ED, or hospital admission in the last 4 weeks  No   How does patient/caregiver feel medication is working? Good   Financial problems or insurance changes  No   If yes, describe changes in insurance or financial issues. N/A   How many doses of your specialty medications were missed in the last 4 weeks? 0   Why were doses missed? N/A   Does this patient require a clinical escalation to a pharmacist? Yes   [Week 2 there was a malfunction with the 2nd injection. She used another pen]                     Follow-up: 21 day(s)     Vira Andrade, Pharmacy Technician  Specialty Pharmacy Technician

## 2022-03-23 ENCOUNTER — SPECIALTY PHARMACY (OUTPATIENT)
Dept: PHARMACY | Facility: TELEHEALTH | Age: 37
End: 2022-03-23

## 2022-03-30 ENCOUNTER — TELEMEDICINE (OUTPATIENT)
Dept: FAMILY MEDICINE CLINIC | Facility: CLINIC | Age: 37
End: 2022-03-30

## 2022-03-30 VITALS — BODY MASS INDEX: 31.67 KG/M2 | WEIGHT: 209 LBS | HEIGHT: 68 IN

## 2022-03-30 DIAGNOSIS — E66.9 OBESITY (BMI 30-39.9): Primary | ICD-10-CM

## 2022-03-30 PROCEDURE — 99213 OFFICE O/P EST LOW 20 MIN: CPT | Performed by: NURSE PRACTITIONER

## 2022-03-30 RX ORDER — PHENTERMINE HYDROCHLORIDE 37.5 MG/1
37.5 CAPSULE ORAL EVERY MORNING
Qty: 30 CAPSULE | Refills: 0 | Status: SHIPPED | OUTPATIENT
Start: 2022-03-30 | End: 2022-05-02 | Stop reason: SDUPTHER

## 2022-03-30 NOTE — PROGRESS NOTES
You have chosen to receive care through a telehealth visit.  Do you consent to use a video/audio connection for your medical care today? YES    Chief Complaint  Obesity    Subjective            Ofelia Barrera presents to [facility]  Pt would like to discuss weight gain. Pt has been on phentermine in the past and worked well for her. Pt is open for discussion for medications for weight loss. Pt reports weight this morning at 209 lbs.     Pt will come in for UDS and control agreement if phentermine is decided upon.    Pt is due pap and will schedule with EPW.         Past Medical History:   Diagnosis Date   • Back pain    • Calculus of kidney    • Essential hypertension    • Lumbar disc displacement without myelopathy 11/19/2013   • Sciatica 11/19/2013    recurrent symptoms in the left S1 dist.   • Weight gain        No Known Allergies     Past Surgical History:   Procedure Laterality Date   • KIDNEY STONE SURGERY      lithotripsy   • OTHER SURGICAL HISTORY  10/28/2013    discectomy        Social History     Tobacco Use   • Smoking status: Never Smoker   • Smokeless tobacco: Never Used   Substance Use Topics   • Alcohol use: Never       Family History   Problem Relation Age of Onset   • Other Other         Spine problems        Current Outpatient Medications on File Prior to Visit   Medication Sig   • clobetasol (TEMOVATE) 0.05 % ointment APPLY TO RASH ON TRUNK, ARMS, AND LEGS twice daily as needed   • hydroCHLOROthiazide (HYDRODIURIL) 25 MG tablet Take 1 tablet by mouth Daily.   • hydrocortisone 2.5 % ointment Apply to psoriasis on face twice daily as needed for flares only   • hydrOXYzine (ATARAX) 25 MG tablet take tablet every 6 hours for itching   • levonorgestrel (MIRENA) 20 MCG/24HR IUD 1 each by Intrauterine route 1 (One) Time.   • lisinopril (PRINIVIL,ZESTRIL) 10 MG tablet Take 1 tablet by mouth Daily.   • [DISCONTINUED] Secukinumab, 300 MG Dose, (Cosentyx Sensoready, 300 MG,)  "150 MG/ML solution auto-injector Inject 2 mL under the skin into the appropriate Every 28 (Twenty-Eight) Days.   • [DISCONTINUED] folic acid (FOLVITE) 1 MG tablet take one tablet by mouth once daily except day that methotrexate is taken   • [DISCONTINUED] methotrexate 2.5 MG tablet take 5 tablets by mouth once weekly   • [DISCONTINUED] Multiple Vitamins-Minerals (MULTIVITAMIN ADULT EXTRA C PO) Take  by mouth.   • [DISCONTINUED] Secukinumab, 300 MG Dose, 150 MG/ML solution auto-injector Inject 300mg (2 pens) under the skin into the appropriate area as directed 1 (One) Time Per Week for 5 weeks.   • [DISCONTINUED] terbinafine (lamISIL) 1 % cream Apply 1 application topically to the appropriate area as directed 2 (Two) Times a Day.   • [DISCONTINUED] triamcinolone (KENALOG) 0.1 % ointment Apply to psoriasis on trunk, arms, and legs twice daily as needed for itching     No current facility-administered medications on file prior to visit.       Health Maintenance Due   Topic Date Due   • PAP SMEAR  Never done       Objective     Ht 172.7 cm (68\")   Wt 94.8 kg (209 lb)   BMI 31.78 kg/m²       Physical Exam  Psychiatric:         Attention and Perception: Attention normal.         Mood and Affect: Mood normal.         Speech: Speech normal.         Behavior: Behavior normal. Behavior is cooperative.         Thought Content: Thought content normal.         Cognition and Memory: Cognition normal.         Judgment: Judgment normal.           Result Review :                           Assessment and Plan        Diagnoses and all orders for this visit:    1. Obesity (BMI 30-39.9) (Primary)  Comments:  encouraged low calorie diet and daily cardio exercise, saxsenda and Wegovy also discussed may switch over at a later date  Orders:  -     phentermine 37.5 MG capsule; Take 1 capsule by mouth Every Morning.  Dispense: 30 capsule; Refill: 0              Follow Up     Return in about 1 year (around 3/30/2023), or if symptoms worsen " or fail to improve.    Patient was given instructions and counseling regarding her condition or for health maintenance advice. Please see specific information pulled into the AVS if appropriate.     Ofelia Barrera  reports that she has never smoked. She has never used smokeless tobacco..

## 2022-04-04 ENCOUNTER — DOCUMENTATION (OUTPATIENT)
Dept: PHARMACY | Facility: TELEHEALTH | Age: 37
End: 2022-04-04

## 2022-04-19 ENCOUNTER — LAB (OUTPATIENT)
Dept: FAMILY MEDICINE CLINIC | Facility: CLINIC | Age: 37
End: 2022-04-19

## 2022-04-19 DIAGNOSIS — Z79.899 LONG TERM USE OF DRUG: Primary | ICD-10-CM

## 2022-04-19 LAB
AMPHET+METHAMPHET UR QL: POSITIVE
AMPHETAMINE INTERNAL CONTROL: ABNORMAL
AMPHETAMINES UR QL: NEGATIVE
BARBITURATE INTERNAL CONTROL: ABNORMAL
BARBITURATES UR QL SCN: NEGATIVE
BENZODIAZ UR QL SCN: NEGATIVE
BENZODIAZEPINE INTERNAL CONTROL: ABNORMAL
BUPRENORPHINE INTERNAL CONTROL: ABNORMAL
BUPRENORPHINE SERPL-MCNC: NEGATIVE NG/ML
CANNABINOIDS SERPL QL: NEGATIVE
COCAINE INTERNAL CONTROL: ABNORMAL
COCAINE UR QL: NEGATIVE
EXPIRATION DATE: ABNORMAL
Lab: ABNORMAL
MDMA (ECSTASY) INTERNAL CONTROL: ABNORMAL
MDMA UR QL SCN: NEGATIVE
METHADONE INTERNAL CONTROL: ABNORMAL
METHADONE UR QL SCN: NEGATIVE
METHAMPHETAMINE INTERNAL CONTROL: ABNORMAL
OPIATES INTERNAL CONTROL: ABNORMAL
OPIATES UR QL: NEGATIVE
OXYCODONE INTERNAL CONTROL: ABNORMAL
OXYCODONE UR QL SCN: NEGATIVE
PCP UR QL SCN: NEGATIVE
PHENCYCLIDINE INTERNAL CONTROL: ABNORMAL
THC INTERNAL CONTROL: ABNORMAL

## 2022-04-19 PROCEDURE — 80305 DRUG TEST PRSMV DIR OPT OBS: CPT | Performed by: NURSE PRACTITIONER

## 2022-04-21 ENCOUNTER — SPECIALTY PHARMACY (OUTPATIENT)
Dept: PHARMACY | Facility: TELEHEALTH | Age: 37
End: 2022-04-21

## 2022-04-21 NOTE — PROGRESS NOTES
Specialty Pharmacy Refill Coordination Note     Ofelia is a 36 y.o. female contacted today regarding refills of  Cosentyx  specialty medication(s).    Reviewed and verified with patient:         Specialty medication(s) and dose(s) confirmed: yes    Refill Questions    Flowsheet Row Most Recent Value   Changes to allergies? No   Changes to medications? No   New conditions since last clinic visit No   Unplanned office visit, urgent care, ED, or hospital admission in the last 4 weeks  No   How does patient/caregiver feel medication is working? Good   Financial problems or insurance changes  No   If yes, describe changes in insurance or financial issues. N/A   How many doses of your specialty medications were missed in the last 4 weeks? 0   Why were doses missed? N/A   Does this patient require a clinical escalation to a pharmacist? No                     Follow-up: 21 day(s)     Vira Andrade, Pharmacy Technician  Specialty Pharmacy Technician

## 2022-05-02 ENCOUNTER — TELEMEDICINE (OUTPATIENT)
Dept: FAMILY MEDICINE CLINIC | Facility: CLINIC | Age: 37
End: 2022-05-02

## 2022-05-02 VITALS — WEIGHT: 202 LBS | BODY MASS INDEX: 30.62 KG/M2 | HEIGHT: 68 IN

## 2022-05-02 DIAGNOSIS — E66.9 OBESITY (BMI 30-39.9): ICD-10-CM

## 2022-05-02 PROCEDURE — 99213 OFFICE O/P EST LOW 20 MIN: CPT | Performed by: NURSE PRACTITIONER

## 2022-05-02 RX ORDER — PHENTERMINE HYDROCHLORIDE 37.5 MG/1
37.5 CAPSULE ORAL EVERY MORNING
Qty: 30 CAPSULE | Refills: 0 | Status: SHIPPED | OUTPATIENT
Start: 2022-05-02 | End: 2022-06-02 | Stop reason: SDUPTHER

## 2022-05-02 NOTE — PROGRESS NOTES
You have chosen to receive care through a telehealth visit.  Do you consent to use a video/audio connection for your medical care today? Yes    Chief Complaint  Obesity    Subjective            Ofelia Barrera presents to [facility]  Pt is a 1 mo f/u for obesity, phentermine #2. Pt's start weight was 209 lbs. Pt reports weight today at 202 lbs. Pt has lost a total of 7 lbs so far. Pt would like a refill sent to [facility]. No issues or concerns at this time.        Past Medical History:   Diagnosis Date   • Back pain    • Calculus of kidney    • Essential hypertension    • Lumbar disc displacement without myelopathy 11/19/2013   • Sciatica 11/19/2013    recurrent symptoms in the left S1 dist.   • Weight gain        No Known Allergies     Past Surgical History:   Procedure Laterality Date   • KIDNEY STONE SURGERY      lithotripsy   • OTHER SURGICAL HISTORY  10/28/2013    discectomy        Social History     Tobacco Use   • Smoking status: Never Smoker   • Smokeless tobacco: Never Used   Substance Use Topics   • Alcohol use: Never       Family History   Problem Relation Age of Onset   • Other Other         Spine problems        Current Outpatient Medications on File Prior to Visit   Medication Sig   • clobetasol (TEMOVATE) 0.05 % ointment APPLY TO RASH ON TRUNK, ARMS, AND LEGS twice daily as needed   • hydroCHLOROthiazide (HYDRODIURIL) 25 MG tablet Take 1 tablet by mouth Daily.   • hydrocortisone 2.5 % ointment Apply to psoriasis on face twice daily as needed for flares only   • hydrOXYzine (ATARAX) 25 MG tablet take tablet every 6 hours for itching   • levonorgestrel (MIRENA) 20 MCG/24HR IUD 1 each by Intrauterine route 1 (One) Time.   • lisinopril (PRINIVIL,ZESTRIL) 10 MG tablet Take 1 tablet by mouth Daily.   • Secukinumab, 300 MG Dose, (Cosentyx Sensoready, 300 MG,) 150 MG/ML solution auto-injector Inject 2 mL under the skin into the appropriate Every 28 (Twenty-Eight) Days.   •  "[DISCONTINUED] phentermine 37.5 MG capsule Take 1 capsule by mouth Every Morning.     No current facility-administered medications on file prior to visit.       Health Maintenance Due   Topic Date Due   • PAP SMEAR  Never done       Objective     Ht 172.7 cm (68\")   Wt 91.6 kg (202 lb)   BMI 30.71 kg/m²       Physical Exam  Psychiatric:         Attention and Perception: Attention normal.         Mood and Affect: Mood normal.         Speech: Speech normal.         Behavior: Behavior normal. Behavior is cooperative.         Thought Content: Thought content normal.         Cognition and Memory: Cognition normal.         Judgment: Judgment normal.           Result Review :                           Assessment and Plan        Diagnoses and all orders for this visit:    1. Obesity (BMI 30-39.9)  Comments:  encouraged low calorie diet and daily cardio exercise, saxsenda and Wegovy also discussed may switch over at a later date  Orders:  -     phentermine 37.5 MG capsule; Take 1 capsule by mouth Every Morning.  Dispense: 30 capsule; Refill: 0              Follow Up     Return in about 1 month (around 6/2/2022).    Patient was given instructions and counseling regarding her condition or for health maintenance advice. Please see specific information pulled into the AVS if appropriate.     Ofelia Barrera  reports that she has never smoked. She has never used smokeless tobacco..  "

## 2022-05-19 ENCOUNTER — DOCUMENTATION (OUTPATIENT)
Dept: PHARMACY | Facility: TELEHEALTH | Age: 37
End: 2022-05-19

## 2022-06-02 ENCOUNTER — TELEMEDICINE (OUTPATIENT)
Dept: FAMILY MEDICINE CLINIC | Facility: CLINIC | Age: 37
End: 2022-06-02

## 2022-06-02 VITALS — HEIGHT: 68 IN | BODY MASS INDEX: 30.62 KG/M2 | WEIGHT: 202 LBS

## 2022-06-02 DIAGNOSIS — E66.9 OBESITY (BMI 30-39.9): ICD-10-CM

## 2022-06-02 PROCEDURE — 99213 OFFICE O/P EST LOW 20 MIN: CPT | Performed by: NURSE PRACTITIONER

## 2022-06-02 RX ORDER — PHENTERMINE HYDROCHLORIDE 37.5 MG/1
37.5 CAPSULE ORAL EVERY MORNING
Qty: 30 CAPSULE | Refills: 0 | Status: SHIPPED | OUTPATIENT
Start: 2022-06-02 | End: 2022-07-28 | Stop reason: SDUPTHER

## 2022-06-02 NOTE — PROGRESS NOTES
You have chosen to receive care through a telehealth visit.  Do you consent to use a video/audio connection for your medical care today? Yes.  Chief Complaint  Obesity    Subjective            Ofelia Barrera presents to [facility]  Pt here today for Phentermine # 3     Start weight 209    Last ov weight 202     Current weight 199    Refill sent to [facility].           Past Medical History:   Diagnosis Date   • Back pain    • Calculus of kidney    • Essential hypertension    • Lumbar disc displacement without myelopathy 11/19/2013   • Sciatica 11/19/2013    recurrent symptoms in the left S1 dist.   • Weight gain        No Known Allergies     Past Surgical History:   Procedure Laterality Date   • KIDNEY STONE SURGERY      lithotripsy   • OTHER SURGICAL HISTORY  10/28/2013    discectomy        Social History     Tobacco Use   • Smoking status: Never Smoker   • Smokeless tobacco: Never Used   Substance Use Topics   • Alcohol use: Never       Family History   Problem Relation Age of Onset   • Other Other         Spine problems        Current Outpatient Medications on File Prior to Visit   Medication Sig   • clobetasol (TEMOVATE) 0.05 % ointment APPLY TO RASH ON TRUNK, ARMS, AND LEGS twice daily as needed   • hydroCHLOROthiazide (HYDRODIURIL) 25 MG tablet Take 1 tablet by mouth Daily.   • hydrocortisone 2.5 % ointment Apply to psoriasis on face twice daily as needed for flares only   • hydrOXYzine (ATARAX) 25 MG tablet take tablet every 6 hours for itching   • levonorgestrel (MIRENA) 20 MCG/24HR IUD 1 each by Intrauterine route 1 (One) Time.   • lisinopril (PRINIVIL,ZESTRIL) 10 MG tablet Take 1 tablet by mouth Daily.   • Secukinumab, 300 MG Dose, (Cosentyx Sensoready, 300 MG,) 150 MG/ML solution auto-injector Inject 2 mL under the skin into the appropriate Every 28 (Twenty-Eight) Days.   • [DISCONTINUED] phentermine 37.5 MG capsule Take 1 capsule by mouth Every Morning.     No current  "facility-administered medications on file prior to visit.       Health Maintenance Due   Topic Date Due   • PAP SMEAR  Never done       Objective     Ht 172.7 cm (68\")   Wt 91.6 kg (202 lb)   BMI 30.71 kg/m²       Physical Exam  Psychiatric:         Attention and Perception: Attention normal.         Mood and Affect: Mood normal.         Speech: Speech normal.         Behavior: Behavior normal. Behavior is cooperative.         Thought Content: Thought content normal.         Cognition and Memory: Cognition normal.         Judgment: Judgment normal.           Result Review :                           Assessment and Plan        Diagnoses and all orders for this visit:    1. Obesity (BMI 30-39.9)  Comments:  encouraged low calorie diet and daily cardio exercise, saxsenda and Wegovy also discussed may switch over at a later date  Orders:  -     phentermine 37.5 MG capsule; Take 1 capsule by mouth Every Morning.  Dispense: 30 capsule; Refill: 0              Follow Up     Return in about 1 month (around 7/2/2022).    Patient was given instructions and counseling regarding her condition or for health maintenance advice. Please see specific information pulled into the AVS if appropriate.     Ofelia Barrera  reports that she has never smoked. She has never used smokeless tobacco.  "

## 2022-07-28 ENCOUNTER — TELEMEDICINE (OUTPATIENT)
Dept: FAMILY MEDICINE CLINIC | Facility: CLINIC | Age: 37
End: 2022-07-28

## 2022-07-28 DIAGNOSIS — E66.9 OBESITY (BMI 30-39.9): Primary | ICD-10-CM

## 2022-07-28 PROCEDURE — 99213 OFFICE O/P EST LOW 20 MIN: CPT | Performed by: NURSE PRACTITIONER

## 2022-07-28 RX ORDER — PHENTERMINE HYDROCHLORIDE 37.5 MG/1
37.5 TABLET ORAL EVERY MORNING
Qty: 30 TABLET | Refills: 0 | Status: SHIPPED | OUTPATIENT
Start: 2022-07-28 | End: 2022-08-29 | Stop reason: SDUPTHER

## 2022-07-28 NOTE — PROGRESS NOTES
Chief Complaint  Follow-up (1 month) and Obesity   You have chosen to receive care through a telehealth visit.  Do you consent to use a video/audio connection for your medical care today? Yes      SUBJECTIVE  Ofelia Barrera presents to [facility]     Follow up for Phentermine #4 refill.  Patient reports her home weight is 199.0 pounds this morning, exactly the same as last month.  Patient states she is exercising 5 days per week and has decreased her caloric intake to 2695-6417 per day.  Patient denies any adverse side effects to medication.    History of Present Illness  Past Medical History:   Diagnosis Date   • Back pain    • Calculus of kidney    • Essential hypertension    • Lumbar disc displacement without myelopathy 11/19/2013   • Sciatica 11/19/2013    recurrent symptoms in the left S1 dist.   • Weight gain       Family History   Problem Relation Age of Onset   • Other Other         Spine problems      Past Surgical History:   Procedure Laterality Date   • KIDNEY STONE SURGERY      lithotripsy   • OTHER SURGICAL HISTORY  10/28/2013    discectomy        Current Outpatient Medications:   •  clobetasol (TEMOVATE) 0.05 % ointment, APPLY TO RASH ON TRUNK, ARMS, AND LEGS twice daily as needed, Disp: 60 g, Rfl: 1  •  hydroCHLOROthiazide (HYDRODIURIL) 25 MG tablet, Take 1 tablet by mouth Daily., Disp: 90 tablet, Rfl: 1  •  hydrocortisone 2.5 % ointment, Apply to psoriasis on face twice daily as needed for flares only, Disp: 28.35 g, Rfl: 1  •  hydrOXYzine (ATARAX) 25 MG tablet, take tablet every 6 hours for itching, Disp: 120 tablet, Rfl: 1  •  levonorgestrel (MIRENA) 20 MCG/24HR IUD, 1 each by Intrauterine route 1 (One) Time., Disp: , Rfl:   •  lisinopril (PRINIVIL,ZESTRIL) 10 MG tablet, Take 1 tablet by mouth Daily., Disp: 90 tablet, Rfl: 1  •  phentermine 37.5 MG capsule, Take 1 capsule by mouth Every Morning., Disp: 30 capsule, Rfl: 0  •  Secukinumab, 300 MG Dose, (Cosentyx  "Sensoready, 300 MG,) 150 MG/ML solution auto-injector, Inject 2 mL under the skin into the appropriate Every 28 (Twenty-Eight) Days., Disp: 2 mL, Rfl: 3    OBJECTIVE  Vital Signs:   There were no vitals taken for this visit.   Estimated body mass index is 30.71 kg/m² as calculated from the following:    Height as of 6/2/22: 172.7 cm (68\").    Weight as of 6/2/22: 91.6 kg (202 lb).     Wt Readings from Last 3 Encounters:   06/02/22 91.6 kg (202 lb)   05/02/22 91.6 kg (202 lb)   03/30/22 94.8 kg (209 lb)     BP Readings from Last 3 Encounters:   12/26/21 135/87   02/02/21 113/65   11/11/20 124/69       Physical Exam  Neurological:      Mental Status: She is alert.   Psychiatric:         Attention and Perception: Attention normal.         Mood and Affect: Mood normal.         Speech: Speech normal.         Behavior: Behavior normal. Behavior is cooperative.         Thought Content: Thought content normal.         Cognition and Memory: Cognition normal.         Judgment: Judgment normal.          Result Review        No Images in the past 120 days found..     The above data has been reviewed by SERINA Smith 07/28/2022 09:43 EDT.          Patient Care Team:  Jojo Andrade APRN as PCP - General (Nurse Practitioner)    ASSESSMENT & PLAN    Diagnoses and all orders for this visit:    1. Obesity (BMI 30-39.9) (Primary)  Comments:  encouraged low calorie diet and daily cardio exercise, saxsenda and Wegovy also discussed may switch over at a later date  Orders:  -     phentermine 37.5 MG capsule; Take 1 capsule by mouth Every Morning.  Dispense: 30 capsule; Refill: 0         Tobacco Use: Low Risk    • Smoking Tobacco Use: Never Smoker   • Smokeless Tobacco Use: Never Used       Follow Up     Return in about 1 month (around 8/28/2022).      Patient was given instructions and counseling regarding her condition or for health maintenance advice. Please see specific information pulled into the AVS if appropriate.   I have " reviewed information obtained and documented by others and I have confirmed the accuracy of this documented note.    SERINA Smtih

## 2022-08-02 ENCOUNTER — TELEPHONE (OUTPATIENT)
Dept: FAMILY MEDICINE CLINIC | Facility: CLINIC | Age: 37
End: 2022-08-02

## 2022-08-02 NOTE — TELEPHONE ENCOUNTER
attempted to call pt 2x to get information prior to telehealth appt.     I attempted to call pt 2x close to appt time.    Pt checked in on [employer], paid co-pay, and checked out on [employer].     No one in office spoke with pt.     Per , pt is to be marked as left without being seen.     VM not set up to leave message.

## 2022-08-29 ENCOUNTER — TELEMEDICINE (OUTPATIENT)
Dept: FAMILY MEDICINE CLINIC | Facility: CLINIC | Age: 37
End: 2022-08-29

## 2022-08-29 VITALS — BODY MASS INDEX: 29.4 KG/M2 | HEIGHT: 68 IN | WEIGHT: 194 LBS

## 2022-08-29 DIAGNOSIS — I10 ESSENTIAL HYPERTENSION: ICD-10-CM

## 2022-08-29 DIAGNOSIS — E66.3 OVERWEIGHT (BMI 25.0-29.9): Primary | ICD-10-CM

## 2022-08-29 PROCEDURE — 99213 OFFICE O/P EST LOW 20 MIN: CPT | Performed by: NURSE PRACTITIONER

## 2022-08-29 RX ORDER — PHENTERMINE HYDROCHLORIDE 37.5 MG/1
37.5 TABLET ORAL EVERY MORNING
Qty: 30 TABLET | Refills: 0 | Status: SHIPPED | OUTPATIENT
Start: 2022-08-29 | End: 2023-03-17

## 2022-08-29 RX ORDER — LISINOPRIL 10 MG/1
10 TABLET ORAL DAILY
Qty: 90 TABLET | Refills: 1 | Status: SHIPPED | OUTPATIENT
Start: 2022-08-29 | End: 2023-03-17 | Stop reason: SDUPTHER

## 2022-08-29 RX ORDER — HYDROCHLOROTHIAZIDE 25 MG/1
25 TABLET ORAL DAILY
Qty: 90 TABLET | Refills: 1 | Status: SHIPPED | OUTPATIENT
Start: 2022-08-29 | End: 2023-03-17 | Stop reason: SDUPTHER

## 2022-08-29 NOTE — PROGRESS NOTES
You have chosen to receive care through a telehealth visit.  Do you consent to use a video/audio connection for your medical care today? Yes    Mode of visit: video    [place]    Location of provider: [facility]       Chief Complaint  Weight Gain    Subjective            Ofelia Barrera presents to [facility]  Pt is a 1 mo f/u for weight gain, phentermine #5. Pt's start weight was 209 lbs. Pt reports weight today at 194 lbs. Pt has lost a total of 15 lbs. No issues or concerns at this time.    Pt would like refills to go to [facility].         Past Medical History:   Diagnosis Date   • Back pain    • Calculus of kidney    • Essential hypertension    • Lumbar disc displacement without myelopathy 11/19/2013   • Sciatica 11/19/2013    recurrent symptoms in the left S1 dist.   • Weight gain        No Known Allergies     Past Surgical History:   Procedure Laterality Date   • KIDNEY STONE SURGERY      lithotripsy   • OTHER SURGICAL HISTORY  10/28/2013    discectomy        Social History     Tobacco Use   • Smoking status: Never Smoker   • Smokeless tobacco: Never Used   Substance Use Topics   • Alcohol use: Never       Family History   Problem Relation Age of Onset   • Other Other         Spine problems        Current Outpatient Medications on File Prior to Visit   Medication Sig   • clobetasol (TEMOVATE) 0.05 % ointment APPLY TO RASH ON TRUNK, ARMS, AND LEGS twice daily as needed   • hydrocortisone 2.5 % ointment Apply to psoriasis on face twice daily as needed for flares only   • hydrOXYzine (ATARAX) 25 MG tablet take tablet every 6 hours for itching   • levonorgestrel (MIRENA) 20 MCG/24HR IUD 1 each by Intrauterine route 1 (One) Time.   • Secukinumab, 300 MG Dose, (Cosentyx Sensoready, 300 MG,) 150 MG/ML solution auto-injector Inject 2 mL under the skin into the appropriate Every 28 (Twenty-Eight) Days.   • clobetasol (TEMOVATE) 0.05 % ointment  "Apply to psoriasis on the trunk and arms twice daily as needed     No current facility-administered medications on file prior to visit.       Health Maintenance Due   Topic Date Due   • PAP SMEAR  Never done       Objective     Ht 172.7 cm (68\")   Wt 88 kg (194 lb)   BMI 29.50 kg/m²       Physical Exam  Constitutional:       Appearance: Normal appearance.   Neurological:      Mental Status: She is alert.   Psychiatric:         Attention and Perception: Attention normal.         Mood and Affect: Mood and affect normal.         Speech: Speech normal.         Behavior: Behavior normal. Behavior is cooperative.         Thought Content: Thought content normal.         Judgment: Judgment normal.           Result Review :                           Assessment and Plan        Diagnoses and all orders for this visit:    1. Overweight (BMI 25.0-29.9) (Primary)  -     phentermine (ADIPEX-P) 37.5 MG tablet; Take 1 tablet by mouth Every Morning.  Dispense: 30 tablet; Refill: 0    2. Essential hypertension  Comments:  stable on lisinopril 10mg and hctz 25mg, continue  Orders:  -     lisinopril (PRINIVIL,ZESTRIL) 10 MG tablet; Take 1 tablet by mouth Daily.  Dispense: 90 tablet; Refill: 1  -     hydroCHLOROthiazide (HYDRODIURIL) 25 MG tablet; Take 1 tablet by mouth Daily.  Dispense: 90 tablet; Refill: 1              Follow Up     Return in about 1 month (around 9/29/2022).    Patient was given instructions and counseling regarding her condition or for health maintenance advice. Please see specific information pulled into the AVS if appropriate.     Ofelia Barrera  reports that she has never smoked. She has never used smokeless tobacco..  "

## 2022-10-27 ENCOUNTER — TRANSCRIBE ORDERS (OUTPATIENT)
Dept: ADMINISTRATIVE | Facility: HOSPITAL | Age: 37
End: 2022-10-27

## 2022-10-27 ENCOUNTER — LAB (OUTPATIENT)
Dept: LAB | Facility: HOSPITAL | Age: 37
End: 2022-10-27

## 2022-10-27 DIAGNOSIS — R09.81 NASAL CONGESTION: Primary | ICD-10-CM

## 2022-10-27 DIAGNOSIS — R09.81 NASAL CONGESTION: ICD-10-CM

## 2022-10-27 LAB
FLUAV AG NPH QL: NEGATIVE
FLUBV AG NPH QL IA: NEGATIVE

## 2022-10-27 PROCEDURE — 87804 INFLUENZA ASSAY W/OPTIC: CPT

## 2022-12-05 ENCOUNTER — OFFICE VISIT (OUTPATIENT)
Dept: OBSTETRICS AND GYNECOLOGY | Facility: CLINIC | Age: 37
End: 2022-12-05

## 2022-12-05 VITALS
HEIGHT: 68 IN | WEIGHT: 207.2 LBS | DIASTOLIC BLOOD PRESSURE: 84 MMHG | BODY MASS INDEX: 31.4 KG/M2 | HEART RATE: 83 BPM | SYSTOLIC BLOOD PRESSURE: 131 MMHG

## 2022-12-05 DIAGNOSIS — Z01.419 ENCOUNTER FOR GYNECOLOGICAL EXAMINATION WITHOUT ABNORMAL FINDING: Primary | ICD-10-CM

## 2022-12-05 PROCEDURE — G0123 SCREEN CERV/VAG THIN LAYER: HCPCS | Performed by: NURSE PRACTITIONER

## 2022-12-05 PROCEDURE — 87624 HPV HI-RISK TYP POOLED RSLT: CPT | Performed by: NURSE PRACTITIONER

## 2022-12-05 PROCEDURE — 99395 PREV VISIT EST AGE 18-39: CPT | Performed by: NURSE PRACTITIONER

## 2022-12-05 NOTE — PROGRESS NOTES
"Well Woman Visit    CC: Scheduled annual well gyn visit  Chief Complaint   Patient presents with   • Gynecologic Exam     wwe       [facility] intake in the past?: No    NOT DONE TODAY due to: negative family history    Contraception:  Mirena IUD  Social History     Substance and Sexual Activity   Sexual Activity Yes   • Partners: Male       HPI:   37 y.o.     Menses:  No cycle with Mirena     Pain:  None    PCP: does manage PMHx and preventative labs  History: PMHx, Meds, Allergies, PSHx, Social Hx, and POBHx all reviewed and updated.    Pt has concerns she would like to discuss. Feels \"bump\" when she has checked her strings for the past two months.     PHYSICAL EXAM:  /84   Pulse 83   Ht 172.7 cm (68\")   Wt 94 kg (207 lb 3.2 oz)   BMI 31.50 kg/m²  Not found.  General- NAD, alert and oriented, appropriate  Psych- Normal mood, good memory  Neck- No masses, no thyroid enlargement  CV- Regular rhythm, no murnurs  Resp- CTA to bases, no wheezes  Abdomen- Soft, non distended, non tender, no masses    Breast left-  Bilaterally symmetrical, no masses, non tender, no nipple discharge  Breast right- Bilaterally symmetrical, no masses, non tender, no nipple discharge    External genitalia- Normal female, no lesions  Urethra/meatus- Normal, no masses, non tender  Bladder- Normal, no masses, non tender  Vagina- Normal, no atrophy, no lesions, no discharge.  Prolapse: none  Cvx- Normal, no lesions, no discharge, No cervical motion tenderness, IUD strings visable 2-3cm, no abnormal bumps palpated on exam.  Uterus- Normal size, shape & consistency.  Non tender, mobile, & no prolapse  Adnexa- No mass, non tender  Anus/Rectum/Perineum- Not performed    Lymphatic- No palpable neck, axillary, or groin nodes  Ext- No edema, no cyanosis    Skin- No lesions, no rashes, no acanthosis nigricans      ASSESSMENT and PLAN:    Diagnoses and all orders for this visit:    1. Encounter for gynecological examination without abnormal " finding (Primary)  -     IgP, Aptima HPV        Preventative:  • BREAST HEALTH- Monthly self breast exam importance and how to reviewed. MMG and/or MRI (prn) reviewed per society guidelines and her individual history. Screen: Not medically needed  • CERVICAL CANCER Screening- Reviewed current ASCCP guidelines for screening w and wo cotest HPV, age specific.  Screen: Updated today  • SEXUAL HEALTH: Declines STD screening  • VACCINATIONS Recommended: COVID and booster PRN, Flu annually, Gardisil/HPV vaccine (up to 46yo).  Importance discussed, risk being unvaccinated reviewed.  Questions answered  • Smoking status- NON SMOKER      She understands the importance of having any ordered tests to be performed in a timely fashion.  The risks of not performing them include, but are not limited to, advanced cancer stages, bone loss from osteoporosis and/or subsequent increase in morbidity and/or mortality.  She is encouraged to review her results online and/or contact or office if she has questions.     Follow Up:  Return in about 1 year (around 12/5/2023) for Annual physical.        Dipesh Hernandez, APRN  12/05/2022    [facility]  [facility]  [address] [address]  Dept: [phone]  Loc: [phone]

## 2022-12-12 ENCOUNTER — TELEPHONE (OUTPATIENT)
Dept: OBSTETRICS AND GYNECOLOGY | Facility: CLINIC | Age: 37
End: 2022-12-12

## 2022-12-12 LAB
CYTOLOGIST CVX/VAG CYTO: ABNORMAL
CYTOLOGY CVX/VAG DOC CYTO: ABNORMAL
CYTOLOGY CVX/VAG DOC THIN PREP: ABNORMAL
DX ICD CODE: ABNORMAL
HIV 1 & 2 AB SER-IMP: ABNORMAL
HPV I/H RISK 4 DNA CVX QL PROBE+SIG AMP: POSITIVE
OTHER STN SPEC: ABNORMAL
STAT OF ADQ CVX/VAG CYTO-IMP: ABNORMAL

## 2022-12-12 NOTE — TELEPHONE ENCOUNTER
----- Message from SERINA Espinoza sent at 12/12/2022 12:44 PM EST -----  Please let patient know her pap came back negative cytology, +HPV. Recommend repeat pap in one year.

## 2023-03-17 ENCOUNTER — OFFICE VISIT (OUTPATIENT)
Dept: INTERNAL MEDICINE | Facility: CLINIC | Age: 38
End: 2023-03-17
Payer: COMMERCIAL

## 2023-03-17 VITALS
DIASTOLIC BLOOD PRESSURE: 77 MMHG | WEIGHT: 219.8 LBS | HEART RATE: 72 BPM | BODY MASS INDEX: 33.31 KG/M2 | SYSTOLIC BLOOD PRESSURE: 114 MMHG | TEMPERATURE: 97.8 F | HEIGHT: 68 IN | RESPIRATION RATE: 18 BRPM | OXYGEN SATURATION: 99 %

## 2023-03-17 DIAGNOSIS — R22.1 NECK FULLNESS: ICD-10-CM

## 2023-03-17 DIAGNOSIS — E66.9 OBESITY (BMI 30-39.9): ICD-10-CM

## 2023-03-17 DIAGNOSIS — I10 ESSENTIAL HYPERTENSION: ICD-10-CM

## 2023-03-17 DIAGNOSIS — Z76.89 ENCOUNTER TO ESTABLISH CARE: ICD-10-CM

## 2023-03-17 DIAGNOSIS — R53.83 OTHER FATIGUE: ICD-10-CM

## 2023-03-17 DIAGNOSIS — Z00.00 ANNUAL PHYSICAL EXAM: Primary | ICD-10-CM

## 2023-03-17 RX ORDER — HYDROCHLOROTHIAZIDE 25 MG/1
25 TABLET ORAL DAILY
Qty: 90 TABLET | Refills: 1 | Status: SHIPPED | OUTPATIENT
Start: 2023-03-17

## 2023-03-17 RX ORDER — LISINOPRIL 10 MG/1
10 TABLET ORAL DAILY
Qty: 90 TABLET | Refills: 1 | Status: SHIPPED | OUTPATIENT
Start: 2023-03-17

## 2023-03-17 NOTE — ASSESSMENT & PLAN NOTE
HTN is stable with current medication regimen.  Continue with lisinopril 10 mg daily and HCTZ 25 mg daily.  We will check renal function and electrolytes on upcoming labs.

## 2023-03-17 NOTE — ASSESSMENT & PLAN NOTE
Pap-UTD   SBE-counseled  Flu vaccine-declines  Covid-19 vaccines-initial series, plus 1 booster.   Recommend regular exercise, healthy diet, routine dental/eye exams.  Wears seat belt  Denies tobacco use.   Alcohol socially.   , one daughter.

## 2023-03-17 NOTE — ASSESSMENT & PLAN NOTE
Newer diagnosis. After booster   Followed by destinee Romero.   She was on methotrexate and went into liver failure.  She stopped that.  Now she is on cosentyx. She tolerates that well.

## 2023-03-17 NOTE — ASSESSMENT & PLAN NOTE
Patient's (Body mass index is 33.43 kg/m².) indicates that they are obese (BMI >30) with health conditions that include hypertension and osteoarthritis . Weight is unchanged. BMI  is above average; BMI management plan is completed. We discussed portion control, increasing exercise and pharmacologic options including wegovy, saxenda.  Patient has been on phentermine multiple times.  She states she does well while she is on it but tends to gain weight right back once stopping.  She was recently on phentermine and did not see much results.  We will start patient on Wegovy.  Risks, benefits, and side effects discussed with patient.   Demonstrated use with patient.  Also advise healthier diet, increasing exercise for optimal results.  We will begin Wegovy after she has her labs completed.  Will follow up in 1 month.

## 2023-03-17 NOTE — PROGRESS NOTES
Chief Complaint  Establish Care (Pt states that she is being seen to establish care with Danyell. Pt states that she is wanting to get labs. She states that she has gain weight and is wanting to see if she can do something for her weight.)    History of Present Illness  SUBJECTIVE  Ofelia Barrera presents to [facility] to establish care and her annual physical. Medical problems include hypertension, psoriasis.     Patient would like to discuss weight. She has been on phentermine and it did help while she was on it but once she came off of it the weight returned.     Patient denies any chest pain, shortness of breath, palpitations, nausea, vomiting, diarrhea, issues with bowel or bladder function.      Past Medical History:   Diagnosis Date   • Back pain    • Calculus of kidney    • Essential hypertension    • Lumbar disc displacement without myelopathy 11/19/2013   • Sciatica 11/19/2013    recurrent symptoms in the left S1 dist.   • Weight gain       Family History   Problem Relation Age of Onset   • Other Other         Spine problems      Past Surgical History:   Procedure Laterality Date   • KIDNEY STONE SURGERY      lithotripsy   • OTHER SURGICAL HISTORY  10/28/2013    discectomy        Current Outpatient Medications:   •  clobetasol (TEMOVATE) 0.05 % ointment, Apply to psoriasis on the trunk and arms twice daily as needed, Disp: 45 g, Rfl: 0  •  hydroCHLOROthiazide (HYDRODIURIL) 25 MG tablet, Take 1 tablet by mouth Daily., Disp: 90 tablet, Rfl: 1  •  levonorgestrel (MIRENA) 20 MCG/24HR IUD, 1 each by Intrauterine route 1 (One) Time., Disp: , Rfl:   •  lisinopril (PRINIVIL,ZESTRIL) 10 MG tablet, Take 1 tablet by mouth Daily., Disp: 90 tablet, Rfl: 1  •  Secukinumab, 300 MG Dose, (Cosentyx Sensoready, 300 MG,) 150 MG/ML solution auto-injector, Inject 2 mL under the skin into the appropriate area as directed Every 28 (Twenty-Eight) Days., Disp: 2 mL, Rfl: 4  •  fluticasone (FLONASE)  "50 MCG/ACT nasal spray, 2 sprays into the nostril(s) as directed by provider Daily., Disp: 16 g, Rfl: 0  •  Semaglutide-Weight Management 0.25 MG/0.5ML solution auto-injector, Inject 0.25 mg under the skin into the appropriate area as directed 1 (One) Time Per Week., Disp: 2 mL, Rfl: 0    OBJECTIVE  Vital Signs:   /77 (BP Location: Left arm)   Pulse 72   Temp 97.8 °F (36.6 °C) (Temporal)   Resp 18   Ht 172.7 cm (67.99\")   Wt 99.7 kg (219 lb 12.8 oz)   SpO2 99%   BMI 33.43 kg/m²    Estimated body mass index is 33.43 kg/m² as calculated from the following:    Height as of this encounter: 172.7 cm (67.99\").    Weight as of this encounter: 99.7 kg (219 lb 12.8 oz).     Wt Readings from Last 3 Encounters:   03/17/23 99.7 kg (219 lb 12.8 oz)   12/05/22 94 kg (207 lb 3.2 oz)   08/29/22 88 kg (194 lb)     BP Readings from Last 3 Encounters:   03/17/23 114/77   12/05/22 131/84   11/29/22 123/81       Physical Exam  Vitals and nursing note reviewed.   Constitutional:       Appearance: Normal appearance.   HENT:      Head: Normocephalic and atraumatic.      Right Ear: Tympanic membrane normal.      Left Ear: Tympanic membrane normal.      Nose: Nose normal.   Eyes:      Extraocular Movements: Extraocular movements intact.      Conjunctiva/sclera: Conjunctivae normal.   Cardiovascular:      Rate and Rhythm: Normal rate and regular rhythm.      Heart sounds: Normal heart sounds. No murmur heard.  Pulmonary:      Effort: Pulmonary effort is normal.      Breath sounds: Normal breath sounds. No wheezing or rales.   Abdominal:      General: Bowel sounds are normal.      Palpations: Abdomen is soft.      Tenderness: There is no abdominal tenderness. There is no guarding.   Musculoskeletal:         General: No swelling. Normal range of motion.      Cervical back: Normal range of motion and neck supple.      Right lower leg: No edema.      Left lower leg: No edema.   Lymphadenopathy:      Cervical: No cervical adenopathy. "   Skin:     General: Skin is warm and dry.   Neurological:      General: No focal deficit present.      Mental Status: She is alert and oriented to person, place, and time. Mental status is at baseline.   Psychiatric:         Mood and Affect: Mood normal.         Behavior: Behavior normal.         Thought Content: Thought content normal.         Judgment: Judgment normal.          Result Review        No Images in the past 120 days found..     The above data has been reviewed by SERINA Yates 03/17/2023 08:21 EDT.          Patient Care Team:  Danyell Sandy APRN as PCP - General (Internal Medicine)      ASSESSMENT & PLAN    Diagnoses and all orders for this visit:    1. Annual physical exam (Primary)  Assessment & Plan:  Pap-UTD   SBE-counseled  Flu vaccine-declines  Covid-19 vaccines-initial series, plus 1 booster.   Recommend regular exercise, healthy diet, routine dental/eye exams.  Wears seat belt  Denies tobacco use.   Alcohol socially.   , one daughter.     Orders:  -     CBC & Differential; Future  -     Comprehensive Metabolic Panel; Future  -     Lipid Panel; Future  -     Urinalysis With Microscopic - Urine, Clean Catch; Future  -     TSH Rfx On Abnormal To Free T4; Future    2. Essential hypertension  Assessment & Plan:  HTN is stable with current medication regimen.  Continue with lisinopril 10 mg daily and HCTZ 25 mg daily.  We will check renal function and electrolytes on upcoming labs.    Orders:  -     CBC & Differential; Future  -     Comprehensive Metabolic Panel; Future  -     Lipid Panel; Future  -     Urinalysis With Microscopic - Urine, Clean Catch; Future  -     TSH Rfx On Abnormal To Free T4; Future  -     hydroCHLOROthiazide (HYDRODIURIL) 25 MG tablet; Take 1 tablet by mouth Daily.  Dispense: 90 tablet; Refill: 1  -     lisinopril (PRINIVIL,ZESTRIL) 10 MG tablet; Take 1 tablet by mouth Daily.  Dispense: 90 tablet; Refill: 1    3. Obesity (BMI 30-39.9)  Assessment & Plan:  Patient's  (Body mass index is 33.43 kg/m².) indicates that they are obese (BMI >30) with health conditions that include hypertension and osteoarthritis . Weight is unchanged. BMI  is above average; BMI management plan is completed. We discussed portion control, increasing exercise and pharmacologic options including wegovy, saxenda.  Patient has been on phentermine multiple times.  She states she does well while she is on it but tends to gain weight right back once stopping.  She was recently on phentermine and did not see much results.  We will start patient on Wegovy.  Risks, benefits, and side effects discussed with patient.   Demonstrated use with patient.  Also advise healthier diet, increasing exercise for optimal results.  We will begin Wegovy after she has her labs completed.  Will follow up in 1 month.      Orders:  -     Thyroid Antibodies; Future  -     Semaglutide-Weight Management 0.25 MG/0.5ML solution auto-injector; Inject 0.25 mg under the skin into the appropriate area as directed 1 (One) Time Per Week.  Dispense: 2 mL; Refill: 0    4. Essential hypertension  Comments:  stable on lisinopril 10mg and hctz 25mg, continue  Assessment & Plan:  HTN is stable with current medication regimen.  Continue with lisinopril 10 mg daily and HCTZ 25 mg daily.  We will check renal function and electrolytes on upcoming labs.    Orders:  -     CBC & Differential; Future  -     Comprehensive Metabolic Panel; Future  -     Lipid Panel; Future  -     Urinalysis With Microscopic - Urine, Clean Catch; Future  -     TSH Rfx On Abnormal To Free T4; Future  -     hydroCHLOROthiazide (HYDRODIURIL) 25 MG tablet; Take 1 tablet by mouth Daily.  Dispense: 90 tablet; Refill: 1  -     lisinopril (PRINIVIL,ZESTRIL) 10 MG tablet; Take 1 tablet by mouth Daily.  Dispense: 90 tablet; Refill: 1    5. Encounter to establish care       Tobacco Use: Low Risk    • Smoking Tobacco Use: Never   • Smokeless Tobacco Use: Never   • Passive Exposure: Not on  file       Follow Up     Return in about 5 weeks (around 4/21/2023).    Please note that portions of this note were completed with a voice recognition program.    Patient was given instructions and counseling regarding her condition or for health maintenance advice. Please see specific information pulled into the AVS if appropriate.   I have reviewed information obtained and documented by others and I have confirmed the accuracy of this documented note.    SERINA Yates

## 2023-03-20 ENCOUNTER — LAB (OUTPATIENT)
Dept: LAB | Facility: HOSPITAL | Age: 38
End: 2023-03-20
Payer: COMMERCIAL

## 2023-03-20 DIAGNOSIS — I10 ESSENTIAL HYPERTENSION: ICD-10-CM

## 2023-03-20 DIAGNOSIS — R53.83 OTHER FATIGUE: ICD-10-CM

## 2023-03-20 DIAGNOSIS — Z00.00 ANNUAL PHYSICAL EXAM: ICD-10-CM

## 2023-03-20 DIAGNOSIS — E66.9 OBESITY (BMI 30-39.9): ICD-10-CM

## 2023-03-20 LAB
ALBUMIN SERPL-MCNC: 4.1 G/DL (ref 3.5–5.2)
ALBUMIN/GLOB SERPL: 1.2 G/DL
ALP SERPL-CCNC: 98 U/L (ref 39–117)
ALT SERPL W P-5'-P-CCNC: 35 U/L (ref 1–33)
ANION GAP SERPL CALCULATED.3IONS-SCNC: 8 MMOL/L (ref 5–15)
AST SERPL-CCNC: 34 U/L (ref 1–32)
BASOPHILS # BLD AUTO: 0.06 10*3/MM3 (ref 0–0.2)
BASOPHILS NFR BLD AUTO: 0.8 % (ref 0–1.5)
BILIRUB SERPL-MCNC: 0.2 MG/DL (ref 0–1.2)
BILIRUB UR QL STRIP: NEGATIVE
BUN SERPL-MCNC: 10 MG/DL (ref 6–20)
BUN/CREAT SERPL: 13.5 (ref 7–25)
CALCIUM SPEC-SCNC: 8.8 MG/DL (ref 8.6–10.5)
CHLORIDE SERPL-SCNC: 101 MMOL/L (ref 98–107)
CHOLEST SERPL-MCNC: 185 MG/DL (ref 0–200)
CLARITY UR: CLEAR
CO2 SERPL-SCNC: 30 MMOL/L (ref 22–29)
COLOR UR: YELLOW
CREAT SERPL-MCNC: 0.74 MG/DL (ref 0.57–1)
DEPRECATED RDW RBC AUTO: 40.1 FL (ref 37–54)
EGFRCR SERPLBLD CKD-EPI 2021: 105.7 ML/MIN/1.73
EOSINOPHIL # BLD AUTO: 0.41 10*3/MM3 (ref 0–0.4)
EOSINOPHIL NFR BLD AUTO: 5.7 % (ref 0.3–6.2)
ERYTHROCYTE [DISTWIDTH] IN BLOOD BY AUTOMATED COUNT: 13.4 % (ref 12.3–15.4)
GLOBULIN UR ELPH-MCNC: 3.4 GM/DL
GLUCOSE SERPL-MCNC: 92 MG/DL (ref 65–99)
GLUCOSE UR STRIP-MCNC: NEGATIVE MG/DL
HCT VFR BLD AUTO: 34.8 % (ref 34–46.6)
HDLC SERPL-MCNC: 41 MG/DL (ref 40–60)
HGB BLD-MCNC: 11.6 G/DL (ref 12–15.9)
HGB UR QL STRIP.AUTO: NEGATIVE
IMM GRANULOCYTES # BLD AUTO: 0.02 10*3/MM3 (ref 0–0.05)
IMM GRANULOCYTES NFR BLD AUTO: 0.3 % (ref 0–0.5)
KETONES UR QL STRIP: NEGATIVE
LDLC SERPL CALC-MCNC: 134 MG/DL (ref 0–100)
LDLC/HDLC SERPL: 3.26 {RATIO}
LEUKOCYTE ESTERASE UR QL STRIP.AUTO: NEGATIVE
LYMPHOCYTES # BLD AUTO: 2.27 10*3/MM3 (ref 0.7–3.1)
LYMPHOCYTES NFR BLD AUTO: 31.6 % (ref 19.6–45.3)
MCH RBC QN AUTO: 27.4 PG (ref 26.6–33)
MCHC RBC AUTO-ENTMCNC: 33.3 G/DL (ref 31.5–35.7)
MCV RBC AUTO: 82.3 FL (ref 79–97)
MONOCYTES # BLD AUTO: 0.54 10*3/MM3 (ref 0.1–0.9)
MONOCYTES NFR BLD AUTO: 7.5 % (ref 5–12)
NEUTROPHILS NFR BLD AUTO: 3.89 10*3/MM3 (ref 1.7–7)
NEUTROPHILS NFR BLD AUTO: 54.1 % (ref 42.7–76)
NITRITE UR QL STRIP: NEGATIVE
NRBC BLD AUTO-RTO: 0 /100 WBC (ref 0–0.2)
PH UR STRIP.AUTO: 6 [PH] (ref 5–8)
PLATELET # BLD AUTO: 373 10*3/MM3 (ref 140–450)
PMV BLD AUTO: 9.6 FL (ref 6–12)
POTASSIUM SERPL-SCNC: 3.1 MMOL/L (ref 3.5–5.2)
PROT SERPL-MCNC: 7.5 G/DL (ref 6–8.5)
PROT UR QL STRIP: NEGATIVE
RBC # BLD AUTO: 4.23 10*6/MM3 (ref 3.77–5.28)
SODIUM SERPL-SCNC: 139 MMOL/L (ref 136–145)
SP GR UR STRIP: 1.02 (ref 1–1.03)
TRIGL SERPL-MCNC: 51 MG/DL (ref 0–150)
TSH SERPL DL<=0.05 MIU/L-ACNC: 3.5 UIU/ML (ref 0.27–4.2)
UROBILINOGEN UR QL STRIP: NORMAL
VLDLC SERPL-MCNC: 10 MG/DL (ref 5–40)
WBC NRBC COR # BLD: 7.19 10*3/MM3 (ref 3.4–10.8)

## 2023-03-20 PROCEDURE — 84481 FREE ASSAY (FT-3): CPT

## 2023-03-20 PROCEDURE — 86376 MICROSOMAL ANTIBODY EACH: CPT

## 2023-03-20 PROCEDURE — 81003 URINALYSIS AUTO W/O SCOPE: CPT

## 2023-03-20 PROCEDURE — 80050 GENERAL HEALTH PANEL: CPT

## 2023-03-20 PROCEDURE — 84439 ASSAY OF FREE THYROXINE: CPT

## 2023-03-20 PROCEDURE — 80061 LIPID PANEL: CPT

## 2023-03-20 PROCEDURE — 81001 URINALYSIS AUTO W/SCOPE: CPT

## 2023-03-20 PROCEDURE — 86800 THYROGLOBULIN ANTIBODY: CPT

## 2023-03-20 PROCEDURE — 36415 COLL VENOUS BLD VENIPUNCTURE: CPT

## 2023-03-21 LAB
BACTERIA UR QL AUTO: ABNORMAL /HPF
HYALINE CASTS UR QL AUTO: ABNORMAL /LPF
RBC # UR STRIP: ABNORMAL /HPF
REF LAB TEST METHOD: ABNORMAL
SQUAMOUS #/AREA URNS HPF: ABNORMAL /HPF
T3FREE SERPL-MCNC: 3.16 PG/ML (ref 2–4.4)
T4 FREE SERPL-MCNC: 0.91 NG/DL (ref 0.93–1.7)
THYROGLOB AB SERPL-ACNC: <1 IU/ML (ref 0–0.9)
THYROPEROXIDASE AB SERPL-ACNC: 12 IU/ML (ref 0–34)
WBC # UR STRIP: ABNORMAL /HPF

## 2023-04-18 ENCOUNTER — HOSPITAL ENCOUNTER (OUTPATIENT)
Dept: ULTRASOUND IMAGING | Facility: HOSPITAL | Age: 38
Discharge: HOME | End: 2023-04-18
Payer: COMMERCIAL

## 2023-04-18 PROCEDURE — 76536 US EXAM OF HEAD AND NECK: CPT

## 2023-04-20 DIAGNOSIS — E04.1 THYROID NODULE: Primary | ICD-10-CM

## 2023-04-26 ENCOUNTER — TELEPHONE (OUTPATIENT)
Dept: INTERNAL MEDICINE | Facility: CLINIC | Age: 38
End: 2023-04-26

## 2023-04-26 NOTE — TELEPHONE ENCOUNTER
Caller: [employer]    Relationship to patient: Other    Best call back number: [phone]    Patient is needing: [facility] CALLING TO LET SERINA RAM KNOW THE PATIENTS WEGOVY MEDICATION HAS BEEN DENIED UNDER HER MEDICATION BENEFITS HOWEVER IT COULD BE APPROVED THROUGH HER MEDICAL BENEFITS.     REFERENCE NUMBER: [id number]

## 2023-06-08 DIAGNOSIS — Z12.31 ENCOUNTER FOR SCREENING MAMMOGRAM FOR MALIGNANT NEOPLASM OF BREAST: Primary | ICD-10-CM

## 2023-07-20 PROBLEM — E04.1 THYROID NODULE: Status: ACTIVE | Noted: 2023-07-20

## 2023-07-20 PROBLEM — E66.811 OBESITY (BMI 30.0-34.9): Status: ACTIVE | Noted: 2022-03-30

## 2023-07-21 ENCOUNTER — LAB (OUTPATIENT)
Dept: LAB | Facility: HOSPITAL | Age: 38
End: 2023-07-21
Payer: COMMERCIAL

## 2023-07-21 DIAGNOSIS — E04.1 THYROID NODULE: ICD-10-CM

## 2023-07-21 DIAGNOSIS — E87.6 HYPOKALEMIA: ICD-10-CM

## 2023-07-21 LAB
ANION GAP SERPL CALCULATED.3IONS-SCNC: 11 MMOL/L (ref 5–15)
BUN SERPL-MCNC: 11 MG/DL (ref 6–20)
BUN/CREAT SERPL: 15.3 (ref 7–25)
CALCIUM SPEC-SCNC: 9.4 MG/DL (ref 8.6–10.5)
CHLORIDE SERPL-SCNC: 103 MMOL/L (ref 98–107)
CO2 SERPL-SCNC: 27 MMOL/L (ref 22–29)
CREAT SERPL-MCNC: 0.72 MG/DL (ref 0.57–1)
EGFRCR SERPLBLD CKD-EPI 2021: 108.6 ML/MIN/1.73
GLUCOSE SERPL-MCNC: 78 MG/DL (ref 65–99)
POTASSIUM SERPL-SCNC: 3.2 MMOL/L (ref 3.5–5.2)
SODIUM SERPL-SCNC: 141 MMOL/L (ref 136–145)
T4 FREE SERPL-MCNC: 1.16 NG/DL (ref 0.93–1.7)
TSH SERPL DL<=0.05 MIU/L-ACNC: 1.83 UIU/ML (ref 0.27–4.2)

## 2023-07-21 PROCEDURE — 36415 COLL VENOUS BLD VENIPUNCTURE: CPT

## 2023-07-21 PROCEDURE — 84443 ASSAY THYROID STIM HORMONE: CPT

## 2023-07-21 PROCEDURE — 84439 ASSAY OF FREE THYROXINE: CPT

## 2023-07-21 PROCEDURE — 80048 BASIC METABOLIC PNL TOTAL CA: CPT

## 2023-07-24 DIAGNOSIS — E66.9 OBESITY (BMI 30.0-34.9): Primary | ICD-10-CM

## 2023-07-24 RX ORDER — PHENTERMINE HYDROCHLORIDE 37.5 MG/1
37.5 CAPSULE ORAL EVERY MORNING
Qty: 30 CAPSULE | Refills: 0 | Status: SHIPPED | OUTPATIENT
Start: 2023-07-24

## 2023-07-24 NOTE — ASSESSMENT & PLAN NOTE
Patient will start phentermine 37.5 mg every morning.  She tolerated that well in the past.  May switch her over to Wegovy per endocrinology approval.  Risk and benefits of medication discussed with patient.  David reviewed, UDS and controlled subs agreement obtained.

## 2023-07-27 ENCOUNTER — HOSPITAL ENCOUNTER (OUTPATIENT)
Dept: MAMMOGRAPHY | Facility: HOSPITAL | Age: 38
Discharge: HOME | End: 2023-07-27
Payer: COMMERCIAL

## 2023-07-27 DIAGNOSIS — Z12.31 ENCOUNTER FOR SCREENING MAMMOGRAM FOR MALIGNANT NEOPLASM OF BREAST: ICD-10-CM

## 2023-07-27 PROCEDURE — 77063 BREAST TOMOSYNTHESIS BI: CPT

## 2023-07-27 PROCEDURE — 77067 SCR MAMMO BI INCL CAD: CPT

## 2023-07-28 ENCOUNTER — TELEPHONE (OUTPATIENT)
Dept: INTERNAL MEDICINE | Facility: CLINIC | Age: 38
End: 2023-07-28
Payer: COMMERCIAL

## 2023-07-28 DIAGNOSIS — N63.20 MASS OF LEFT BREAST, UNSPECIFIED QUADRANT: Primary | ICD-10-CM

## 2023-08-08 ENCOUNTER — HOSPITAL ENCOUNTER (OUTPATIENT)
Dept: MAMMOGRAPHY | Facility: HOSPITAL | Age: 38
Discharge: HOME | End: 2023-08-08
Payer: COMMERCIAL

## 2023-08-08 ENCOUNTER — HOSPITAL ENCOUNTER (OUTPATIENT)
Dept: ULTRASOUND IMAGING | Facility: HOSPITAL | Age: 38
Discharge: HOME | End: 2023-08-08
Payer: COMMERCIAL

## 2023-08-08 DIAGNOSIS — N63.20 MASS OF LEFT BREAST, UNSPECIFIED QUADRANT: ICD-10-CM

## 2023-08-08 PROCEDURE — G0279 TOMOSYNTHESIS, MAMMO: HCPCS

## 2023-08-08 PROCEDURE — 77065 DX MAMMO INCL CAD UNI: CPT

## 2023-08-08 PROCEDURE — 76642 ULTRASOUND BREAST LIMITED: CPT

## 2023-08-10 ENCOUNTER — OFFICE VISIT (OUTPATIENT)
Dept: ENDOCRINOLOGY | Facility: CLINIC | Age: 38
End: 2023-08-10
Payer: COMMERCIAL

## 2023-08-10 VITALS
WEIGHT: 222 LBS | DIASTOLIC BLOOD PRESSURE: 74 MMHG | HEIGHT: 67 IN | SYSTOLIC BLOOD PRESSURE: 120 MMHG | BODY MASS INDEX: 34.84 KG/M2 | OXYGEN SATURATION: 98 % | HEART RATE: 77 BPM

## 2023-08-10 DIAGNOSIS — R53.82 CHRONIC FATIGUE: ICD-10-CM

## 2023-08-10 DIAGNOSIS — R63.5 WEIGHT GAIN: ICD-10-CM

## 2023-08-10 DIAGNOSIS — E04.1 THYROID NODULE: Primary | ICD-10-CM

## 2023-08-10 NOTE — PROGRESS NOTES
Chief Complaint   Patient presents with    Thyroid Problem     nodule        Referring Provider  Danyell Sandy APRN HPI   Ofelia Barrera is a 38 y.o. female had concerns including Thyroid Problem (nodule).   New patient referred today for thyroid nodule.   Pt has been struggling with weight gain, fatigue, cold intolerance.   At times has felt pressure in her neck which is relieved when she extends her neck and pulls on the base of her neck.   This pressure sensation prompted labs and thyroid ultrasound.  Ultrasound noted a 1.4 cm right lobe nodule.  Recommended repeat ultrasound in 1 year.  TSH was mid upper normal, T4 was low on prior labs.  Reviewed they were repeated recently and TFTs were back into a normal range.  She has had no change in symptoms.    Gets about 8 hrs of sleep at night. Occasionally snoring at night.     Is taking phentermine. Has lost 6 lbs. Was going to start wegovy but didn't take due to concern about the thyroid nodule.     Past Medical History:   Diagnosis Date    Back pain     Calculus of kidney     Essential hypertension     Lumbar disc displacement without myelopathy 11/19/2013    Sciatica 11/19/2013    recurrent symptoms in the left S1 dist.    Weight gain      Past Surgical History:   Procedure Laterality Date    KIDNEY STONE SURGERY      lithotripsy    OTHER SURGICAL HISTORY  10/28/2013    discectomy      Family History   Problem Relation Age of Onset    Other Other         Spine problems      Social History     Socioeconomic History    Marital status:    Tobacco Use    Smoking status: Never    Smokeless tobacco: Never   Vaping Use    Vaping Use: Never used   Substance and Sexual Activity    Alcohol use: Never    Drug use: Never    Sexual activity: Yes     Partners: Male      No Known Allergies   Current Outpatient Medications on File Prior to Visit   Medication Sig Dispense Refill    clobetasol (TEMOVATE) 0.05 % ointment Apply to psoriasis on the trunk and arms twice  "daily as needed 45 g 0    hydroCHLOROthiazide (HYDRODIURIL) 25 MG tablet Take 1 tablet by mouth Daily. 90 tablet 1    levonorgestrel (MIRENA) 20 MCG/24HR IUD 1 each by Intrauterine route 1 (One) Time.      lisinopril (PRINIVIL,ZESTRIL) 10 MG tablet Take 1 tablet by mouth Daily. 90 tablet 1    phentermine 37.5 MG capsule Take 1 capsule by mouth Every Morning. 30 capsule 0    potassium chloride 10 MEQ CR tablet Take 1 tablet by mouth Daily. 30 tablet 5    [DISCONTINUED] fluticasone (FLONASE) 50 MCG/ACT nasal spray 2 sprays into the nostril(s) as directed by provider Daily. (Patient not taking: Reported on 7/20/2023) 16 g 0    [DISCONTINUED] Secukinumab, 300 MG Dose, (Cosentyx Sensoready, 300 MG,) 150 MG/ML solution auto-injector Inject 2 mL under the skin into the appropriate area as directed Every 28 (Twenty-Eight) Days. (Patient not taking: Reported on 7/20/2023) 2 mL 4    [DISCONTINUED] Semaglutide-Weight Management 0.25 MG/0.5ML solution auto-injector Inject 0.25 mg under the skin into the appropriate area as directed 1 (One) Time Per Week. (Patient not taking: Reported on 7/20/2023) 2 mL 0     No current facility-administered medications on file prior to visit.        Review of Systems   Constitutional:  Positive for fatigue and unexpected weight gain.   HENT:          See HPI   Endocrine: Positive for cold intolerance.      /74   Pulse 77   Ht 170.2 cm (67\")   Wt 101 kg (222 lb)   SpO2 98%   BMI 34.77 kg/mý      Physical Exam  Vitals reviewed.   Constitutional:       Appearance: Normal appearance.   Neck:      Thyroid: No thyroid mass or thyromegaly.   Cardiovascular:      Rate and Rhythm: Normal rate.   Pulmonary:      Effort: Pulmonary effort is normal.   Neurological:      General: No focal deficit present.      Mental Status: She is alert. Mental status is at baseline.   Psychiatric:         Mood and Affect: Mood normal.         Behavior: Behavior normal.         Labs/Imaging  CMP  Lab Results "   Component Value Date    GLUCOSE 78 07/21/2023    BUN 11 07/21/2023    CREATININE 0.72 07/21/2023    EGFRIFNONA 76 02/18/2022    BCR 15.3 07/21/2023    K 3.2 (L) 07/21/2023    CO2 27.0 07/21/2023    CALCIUM 9.4 07/21/2023    ALBUMIN 4.1 03/20/2023    LABIL2 1.1 (L) 12/30/2020    AST 34 (H) 03/20/2023    ALT 35 (H) 03/20/2023        CBC w/DIFF   Lab Results   Component Value Date    WBC 7.19 03/20/2023    RBC 4.23 03/20/2023    HGB 11.6 (L) 03/20/2023    HCT 34.8 03/20/2023    MCV 82.3 03/20/2023    MCH 27.4 03/20/2023    MCHC 33.3 03/20/2023    RDW 13.4 03/20/2023    RDWSD 40.1 03/20/2023    MPV 9.6 03/20/2023     03/20/2023    NEUTRORELPCT 54.1 03/20/2023    LYMPHORELPCT 31.6 03/20/2023    MONORELPCT 7.5 03/20/2023    EOSRELPCT 5.7 03/20/2023    BASORELPCT 0.8 03/20/2023    AUTOIGPER 0.3 03/20/2023    NEUTROABS 3.89 03/20/2023    LYMPHSABS 2.27 03/20/2023    MONOSABS 0.54 03/20/2023    EOSABS 0.41 (H) 03/20/2023    BASOSABS 0.06 03/20/2023    AUTOIGNUM 0.02 03/20/2023    NRBC 0.0 03/20/2023     TSH  Lab Results   Component Value Date    TSH 1.830 07/21/2023    TSH 3.500 03/20/2023    TSH 1.850 02/18/2022     T4  Lab Results   Component Value Date    FREET4 1.16 07/21/2023    FREET4 0.91 (L) 03/20/2023    FREET4 0.93 02/18/2022     T3  Lab Results   Component Value Date    T3FREE 3.16 03/20/2023     TPO  Lab Results   Component Value Date    THYROIDAB 12 03/20/2023     Narrative & Impression   PROCEDURE:  US THYROID     COMPARISON: None     INDICATIONS:  neck fullness     TECHNIQUE:    High-resolution ultrasound examination of the thyroid gland was performed.       FINDINGS:          The right lobe of the thyroid measures 4.7 x 1.7 x 1.4 cm. The right lobe is homogeneous in   echotexture.  There is a solid-appearing focus with well-defined margins at the midpole of the   right lobe.  This finding is primarily isoechoic.  The finding measures 1.4 x 1.1 x 1.1 cm.     The left lobe measures 4.1 x 1.6 x 1.5 cm.  The left lobe is homogeneous in echotexture without   focal abnormality.     The isthmus measures 0.2 cm in thickness. No focal abnormalities are seen involving the isthmus.     Nonspecific lymph nodes are noted within the soft tissues at the right left aspect of the thyroid.     TIRADS CLASSIFICATION:  TIRADS 4 - Moderately Suspicious. Recommend follow up thyroid ultrasound in   12 months.       IMPRESSION:                 1. Evidence for a prominent nodule within the midpole of the right lobe of the thyroid measuring   1.4 cm.  Follow-up is recommended.            ALE WHEELER MD         Electronically Signed and Approved By: ALE WHEELER MD on 4/19/2023 at 0:06                     Thyroid Ultrasound 08/10/23    Indication: thyroid nodules  Comparison Imaging: Ultrasound report 4/19/2023  Clinical History: Right lobe thyroid nodule    Real time high resolution imaging of the thyroid gland was performed in transverse and longitudinal planes. Previous imaging reports were reviewed if available and compared to the current to assess stability.     Lobes:  The right lobe measured 5.0 cm L x 1.4 cm AP x 1.6 cm in TV dimension.    The isthmus measured 0.2 cm in thickness.    The left thyroid lobe measured 5.5 cm L x 1.4 cm AP x 1.3 cm in TV dimension.    Thyroid gland is homogeneous and contains single nodule in the right lobe.    Nodule 1   located in the right mid lobe and measures 1.2 x 1.0 x 1.0 cm (L X AP X TV).  This nodule is mixed solid and cystic, heterogeneous, hypoechoic with well-defined margins, and Grade I vascularity on Color Flow Doppler.  It has no artifacts. This nodule previously measured 1.4 x 1.1 x 1.1 cm.     No pathologic lymph nodes were seen.    Impression:  Homogeneous gland, normal size with a single right lobe nodule measuring 1.2 x 1.0 x 1.0 cm.  This nodule has decreased slightly in size from prior ultrasound when it measured 1.4 x 1.1 x 1.1 cm.  No left lobe  nodules.    Recommendation:  Repeat ultrasound in 1 year.      Assessment and Plan    Diagnoses and all orders for this visit:    1. Thyroid nodule (Primary)  Ultrasound report reviewed from April.  Ultrasound was updated in the office today, see detailed report as above.  The right lobe nodule is mixed solid and cystic and has decreased slightly in size.  No FNA indicated.  Repeat ultrasound in 1 year.  -     US Thyroid    2. Weight gain  Now on phentermine.  No contraindication to use of Wegovy or alternate GLP-1 receptor agonists.    3. Chronic fatigue  Thyroid levels slightly abnormal from April but on recheck were normal.  This may have been a transient fluctuation- due to weight gain.  She is clinically euthyroid.  Monitor thyroid levels at least yearly or more often if she has a change in her symptoms.       Return in about 1 year (around 8/10/2024) for next scheduled follow up, with thyroid ultrasound ** 30 min appt. The patient was instructed to contact the clinic with any interval questions or concerns.    Leora Montes, DO   Endocrinologist    Please note that portions of this note were completed with a voice recognition program.

## 2023-08-24 ENCOUNTER — OFFICE VISIT (OUTPATIENT)
Dept: INTERNAL MEDICINE | Facility: CLINIC | Age: 38
End: 2023-08-24
Payer: COMMERCIAL

## 2023-08-24 VITALS
HEART RATE: 70 BPM | RESPIRATION RATE: 18 BRPM | SYSTOLIC BLOOD PRESSURE: 112 MMHG | WEIGHT: 216 LBS | TEMPERATURE: 97.4 F | OXYGEN SATURATION: 99 % | HEIGHT: 67 IN | BODY MASS INDEX: 33.9 KG/M2 | DIASTOLIC BLOOD PRESSURE: 70 MMHG

## 2023-08-24 DIAGNOSIS — E66.9 OBESITY (BMI 30.0-34.9): Primary | ICD-10-CM

## 2023-08-24 DIAGNOSIS — E04.1 THYROID NODULE: ICD-10-CM

## 2023-08-24 DIAGNOSIS — I10 ESSENTIAL HYPERTENSION: ICD-10-CM

## 2023-08-24 PROCEDURE — 99214 OFFICE O/P EST MOD 30 MIN: CPT

## 2023-08-24 RX ORDER — PHENTERMINE HYDROCHLORIDE 37.5 MG/1
37.5 CAPSULE ORAL EVERY MORNING
Qty: 30 CAPSULE | Refills: 0 | Status: SHIPPED | OUTPATIENT
Start: 2023-08-24

## 2023-08-24 NOTE — ASSESSMENT & PLAN NOTE
She was seen by endocrinology.  I reviewed the office visit note.  It appears they are just going to monitor thyroid nodule and has appointment to follow-up next year.

## 2023-08-24 NOTE — ASSESSMENT & PLAN NOTE
Patient's (Body mass index is 33.83 kg/mý.) indicates that they are obese (BMI >30) with health conditions that include hypertension and dyslipidemias . Weight is improving with treatment. BMI  is above average; BMI management plan is completed. We discussed portion control and increasing exercise. Patient is currently on phentermine.  She has lost about 12 pounds since starting last month.  She is doing well with it. We will keep her on that for a couple more months.   She denies any palpitations or side effects.   We will start her on Wegovy as she was cleared by endocrinology to start GLP-1 receptor agonist.  Risks/benefits discussed with patient.  Follow up in 1 month

## 2023-08-24 NOTE — PROGRESS NOTES
Chief Complaint  Follow-up (38 year old female here today for a 1 month follow up on weight loss and Thyroid nodules. States she seen Ana with Endocrinology August 10th. States she said everything look ed good, repeat Thyroid US was good, nodule is unchanged or smaller than last US. /She is down 6 pounds from last weight. )    History of Present Illness  SUBJECTIVE  Ofelia Barrera presents to [facility] follow up on weight and discuss endocrinology appt.    He is doing well overall.  She denies any current issues or complaints today.    Past Medical History:   Diagnosis Date    Back pain     Calculus of kidney     Essential hypertension     Lumbar disc displacement without myelopathy 11/19/2013    Sciatica 11/19/2013    recurrent symptoms in the left S1 dist.    Weight gain       Family History   Problem Relation Age of Onset    Other Other         Spine problems      Past Surgical History:   Procedure Laterality Date    KIDNEY STONE SURGERY      lithotripsy    OTHER SURGICAL HISTORY  10/28/2013    discectomy        Current Outpatient Medications:     clobetasol (TEMOVATE) 0.05 % ointment, Apply to psoriasis on the trunk and arms twice daily as needed, Disp: 45 g, Rfl: 0    hydroCHLOROthiazide (HYDRODIURIL) 25 MG tablet, Take 1 tablet by mouth Daily., Disp: 90 tablet, Rfl: 1    levonorgestrel (MIRENA) 20 MCG/24HR IUD, 1 each by Intrauterine route 1 (One) Time., Disp: , Rfl:     lisinopril (PRINIVIL,ZESTRIL) 10 MG tablet, Take 1 tablet by mouth Daily., Disp: 90 tablet, Rfl: 1    phentermine 37.5 MG capsule, Take 1 capsule by mouth Every Morning., Disp: 30 capsule, Rfl: 0    potassium chloride 10 MEQ CR tablet, Take 1 tablet by mouth Daily., Disp: 30 tablet, Rfl: 5    Semaglutide-Weight Management 0.25 MG/0.5ML solution auto-injector, Inject 0.25 mg under the skin into the appropriate area as directed 1 (One) Time Per Week., Disp: 2 mL, Rfl: 0    OBJECTIVE  Vital Signs:   BP  "112/70 (BP Location: Left arm, Patient Position: Sitting)   Pulse 70   Temp 97.4 øF (36.3 øC) (Temporal)   Resp 18   Ht 170.2 cm (67\")   Wt 98 kg (216 lb)   SpO2 99%   BMI 33.83 kg/mý    Estimated body mass index is 33.83 kg/mý as calculated from the following:    Height as of this encounter: 170.2 cm (67\").    Weight as of this encounter: 98 kg (216 lb).     Wt Readings from Last 3 Encounters:   08/24/23 98 kg (216 lb)   08/10/23 101 kg (222 lb)   07/20/23 104 kg (228 lb 3.2 oz)     BP Readings from Last 3 Encounters:   08/24/23 112/70   08/10/23 120/74   07/20/23 122/76       Physical Exam  Vitals and nursing note reviewed.   Constitutional:       Appearance: Normal appearance.   HENT:      Head: Normocephalic.   Eyes:      Extraocular Movements: Extraocular movements intact.      Conjunctiva/sclera: Conjunctivae normal.   Cardiovascular:      Rate and Rhythm: Normal rate and regular rhythm.      Heart sounds: Normal heart sounds. No murmur heard.  Pulmonary:      Effort: Pulmonary effort is normal.      Breath sounds: Normal breath sounds. No wheezing or rales.   Musculoskeletal:         General: No swelling. Normal range of motion.   Skin:     General: Skin is warm and dry.   Neurological:      General: No focal deficit present.      Mental Status: She is alert and oriented to person, place, and time. Mental status is at baseline.   Psychiatric:         Mood and Affect: Mood normal.         Behavior: Behavior normal.         Thought Content: Thought content normal.         Judgment: Judgment normal.        Result Review        Mammo Diagnostic Digital Tomosynthesis Left With CAD    Result Date: 8/8/2023  Probably benign diagnostic left mammogram and targeted left breast ultrasound.  I discussed findings and recommendations with the patient.  RECOMMENDATION(S):  SHORT TERM FOLLOW-UP DIAGNOSTIC MAMMOGRAM LEFT BREAST IN 6 MONTHS.   BIRADS:  DIAGNOSTIC CATEGORY 3--PROBABLY BENIGN FINDING.   BREAST " COMPOSITION: Heterogeneously dense,which may obscure small masses.  PLEASE NOTE:  A NORMAL MAMMOGRAM DOES NOT EXCLUDE THE POSSIBILITY OF BREAST CANCER. ANY CLINICALLY SUSPICIOUS PALPABLE LUMP SHOULD BE BIOPSIED.      LIAN MCMANUS MD       Electronically Signed and Approved By: LIAN MCMANUS MD on 8/08/2023 at 12:53             US Breast Left Limited    Result Date: 8/8/2023  Probably benign diagnostic left mammogram and targeted left breast ultrasound.  I discussed findings and recommendations with the patient.  RECOMMENDATION(S):  SHORT TERM FOLLOW-UP DIAGNOSTIC MAMMOGRAM LEFT BREAST IN 6 MONTHS.   BIRADS:  DIAGNOSTIC CATEGORY 3--PROBABLY BENIGN FINDING.   BREAST COMPOSITION: Heterogeneously dense,which may obscure small masses.  PLEASE NOTE:  A NORMAL MAMMOGRAM DOES NOT EXCLUDE THE POSSIBILITY OF BREAST CANCER. ANY CLINICALLY SUSPICIOUS PALPABLE LUMP SHOULD BE BIOPSIED.      LIAN MCMANUS MD       Electronically Signed and Approved By: LIAN MCMANUS MD on 8/08/2023 at 12:53             Mammo Screening Digital Tomosynthesis Bilateral With CAD    Result Date: 7/28/2023   One view asymmetry in the lateral far posterior left breast on the XCC view.  Repeat routine views are recommended to include all of the posterior soft tissue is recommended as well as possible spot compression view of the left breast.  Images are marked for reference.  Ultrasound may be helpful if warranted after the additional mammographic evaluation if deemed necessary at that time by the interpreting radiologist.  RECOMMENDATION(S):  ADDITIONAL MAMMOGRAPHIC VIEWS REQUIRED: LEFT BREAST  --NEED ADDITIONAL IMAGING EVALUATION.   ULTRASOUND: LEFT BREAST  --NEED ADDITIONAL IMAGING EVALUATION.   BIRADS:  DIAGNOSTIC CATEGORY 0--INCOMPLETE: NEEDS ADDITIONAL IMAGING EVALUATION.   BREAST COMPOSITION: Heterogeneously dense,which may obscure small masses.  PLEASE NOTE:  A NORMAL MAMMOGRAM DOES NOT EXCLUDE THE POSSIBILITY OF BREAST CANCER. ANY CLINICALLY  SUSPICIOUS PALPABLE LUMP SHOULD BE BIOPSIED.      CAROLINA QUAN DO       Electronically Signed and Approved By: CAROLINA QUAN DO on 7/28/2023 at 9:02                The above data has been reviewed by SERINA Yates 08/24/2023 08:45 EDT.          Patient Care Team:  Danyell Sandy APRN as PCP - General (Internal Medicine)  Leora Montes DO as Consulting Physician (Endocrinology)    Viewed office visit notes from endocrinology.       ASSESSMENT & PLAN    Diagnoses and all orders for this visit:    1. Obesity (BMI 30.0-34.9) (Primary)  Assessment & Plan:  Patient's (Body mass index is 33.83 kg/mý.) indicates that they are obese (BMI >30) with health conditions that include hypertension and dyslipidemias . Weight is improving with treatment. BMI  is above average; BMI management plan is completed. We discussed portion control and increasing exercise. Patient is currently on phentermine.  She has lost about 12 pounds since starting last month.  She is doing well with it. We will keep her on that for a couple more months.   She denies any palpitations or side effects.   We will start her on Wegovy as she was cleared by endocrinology to start GLP-1 receptor agonist.  Risks/benefits discussed with patient.  Follow up in 1 month    Orders:  -     Semaglutide-Weight Management 0.25 MG/0.5ML solution auto-injector; Inject 0.25 mg under the skin into the appropriate area as directed 1 (One) Time Per Week.  Dispense: 2 mL; Refill: 0  -     phentermine 37.5 MG capsule; Take 1 capsule by mouth Every Morning.  Dispense: 30 capsule; Refill: 0    2. Thyroid nodule  Assessment & Plan:  She was seen by endocrinology.  I reviewed the office visit note.  It appears they are just going to monitor thyroid nodule and has appointment to follow-up next year.        3. Essential hypertension  Assessment & Plan:  Blood pressure is well controlled.  Continue lisinopril-hctz           Tobacco Use: Low Risk     Smoking Tobacco Use:  Never    Smokeless Tobacco Use: Never    Passive Exposure: Not on file       Follow Up     Return in about 4 weeks (around 9/21/2023).    Please note that portions of this note were completed with a voice recognition program.    Patient was given instructions and counseling regarding her condition or for health maintenance advice. Please see specific information pulled into the AVS if appropriate.   I have reviewed information obtained and documented by others and I have confirmed the accuracy of this documented note.    SERINA Yates

## 2023-08-28 ENCOUNTER — TELEPHONE (OUTPATIENT)
Dept: INTERNAL MEDICINE | Facility: CLINIC | Age: 38
End: 2023-08-28
Payer: COMMERCIAL

## 2023-09-08 RX ORDER — BENZONATATE 100 MG/1
100 CAPSULE ORAL 3 TIMES DAILY PRN
Qty: 30 CAPSULE | Refills: 0 | Status: SHIPPED | OUTPATIENT
Start: 2023-09-08

## 2023-09-26 ENCOUNTER — OFFICE VISIT (OUTPATIENT)
Dept: INTERNAL MEDICINE | Facility: CLINIC | Age: 38
End: 2023-09-26
Payer: COMMERCIAL

## 2023-09-26 VITALS
BODY MASS INDEX: 33.43 KG/M2 | TEMPERATURE: 97.6 F | SYSTOLIC BLOOD PRESSURE: 122 MMHG | OXYGEN SATURATION: 98 % | HEIGHT: 67 IN | HEART RATE: 74 BPM | DIASTOLIC BLOOD PRESSURE: 80 MMHG | WEIGHT: 213 LBS | RESPIRATION RATE: 18 BRPM

## 2023-09-26 DIAGNOSIS — E66.9 OBESITY (BMI 30.0-34.9): ICD-10-CM

## 2023-09-26 DIAGNOSIS — I10 ESSENTIAL HYPERTENSION: ICD-10-CM

## 2023-09-26 PROCEDURE — 99213 OFFICE O/P EST LOW 20 MIN: CPT

## 2023-09-26 RX ORDER — PHENTERMINE HYDROCHLORIDE 37.5 MG/1
37.5 CAPSULE ORAL EVERY MORNING
Qty: 30 CAPSULE | Refills: 0 | Status: SHIPPED | OUTPATIENT
Start: 2023-09-26

## 2023-09-26 RX ORDER — LISINOPRIL 10 MG/1
10 TABLET ORAL DAILY
Qty: 90 TABLET | Refills: 1 | Status: SHIPPED | OUTPATIENT
Start: 2023-09-26

## 2023-09-26 RX ORDER — HYDROCHLOROTHIAZIDE 25 MG/1
25 TABLET ORAL DAILY
Qty: 90 TABLET | Refills: 1 | Status: SHIPPED | OUTPATIENT
Start: 2023-09-26

## 2023-09-26 NOTE — PROGRESS NOTES
"Chief Complaint  Weight Loss (38 year old female here today for a 1 month follow up on Phentermine for weight loss. States she tolerates well and has been doing well. )    History of Present Illness  SUBJECTIVE  Ofelia Barrera presents to [facility] follow up on weight loss.     She is doing well overall.    No issues or complaints at this time.      Past Medical History:   Diagnosis Date    Back pain     Calculus of kidney     Essential hypertension     Lumbar disc displacement without myelopathy 11/19/2013    Sciatica 11/19/2013    recurrent symptoms in the left S1 dist.    Weight gain       Family History   Problem Relation Age of Onset    Other Other         Spine problems      Past Surgical History:   Procedure Laterality Date    KIDNEY STONE SURGERY      lithotripsy    OTHER SURGICAL HISTORY  10/28/2013    discectomy        Current Outpatient Medications:     hydroCHLOROthiazide (HYDRODIURIL) 25 MG tablet, Take 1 tablet by mouth Daily., Disp: 90 tablet, Rfl: 1    lisinopril (PRINIVIL,ZESTRIL) 10 MG tablet, Take 1 tablet by mouth Daily., Disp: 90 tablet, Rfl: 1    phentermine 37.5 MG capsule, Take 1 capsule by mouth Every Morning., Disp: 30 capsule, Rfl: 0    potassium chloride 10 MEQ CR tablet, Take 1 tablet by mouth Daily., Disp: 30 tablet, Rfl: 5    levonorgestrel (MIRENA) 20 MCG/24HR IUD, 1 each by Intrauterine route 1 (One) Time., Disp: , Rfl:     Semaglutide-Weight Management 0.25 MG/0.5ML solution auto-injector, Inject 0.25 mg under the skin into the appropriate area as directed 1 (One) Time Per Week. (Patient not taking: Reported on 9/26/2023), Disp: 2 mL, Rfl: 0    OBJECTIVE  Vital Signs:   /80 (BP Location: Left arm, Patient Position: Sitting)   Pulse 74   Temp 97.6 °F (36.4 °C) (Temporal)   Resp 18   Ht 170.2 cm (67\")   Wt 96.6 kg (213 lb)   SpO2 98%   BMI 33.36 kg/m²    Estimated body mass index is 33.36 kg/m² as calculated from the following:    " "Height as of this encounter: 170.2 cm (67\").    Weight as of this encounter: 96.6 kg (213 lb).     Wt Readings from Last 3 Encounters:   09/26/23 96.6 kg (213 lb)   08/24/23 98 kg (216 lb)   08/10/23 101 kg (222 lb)     BP Readings from Last 3 Encounters:   09/26/23 122/80   08/24/23 112/70   08/10/23 120/74       Physical Exam  Vitals and nursing note reviewed.   Constitutional:       Appearance: Normal appearance.   HENT:      Head: Normocephalic.   Eyes:      Extraocular Movements: Extraocular movements intact.      Conjunctiva/sclera: Conjunctivae normal.   Cardiovascular:      Rate and Rhythm: Normal rate and regular rhythm.      Heart sounds: Normal heart sounds. No murmur heard.  Pulmonary:      Effort: Pulmonary effort is normal.      Breath sounds: Normal breath sounds. No wheezing or rales.   Abdominal:      General: Bowel sounds are normal.      Palpations: Abdomen is soft.      Tenderness: There is no abdominal tenderness. There is no guarding.   Musculoskeletal:         General: No swelling. Normal range of motion.      Cervical back: Normal range of motion and neck supple.   Skin:     General: Skin is warm and dry.   Neurological:      General: No focal deficit present.      Mental Status: She is alert and oriented to person, place, and time. Mental status is at baseline.   Psychiatric:         Mood and Affect: Mood normal.         Behavior: Behavior normal.         Thought Content: Thought content normal.         Judgment: Judgment normal.        Result Review        Mammo Diagnostic Digital Tomosynthesis Left With CAD    Result Date: 8/8/2023  Probably benign diagnostic left mammogram and targeted left breast ultrasound.  I discussed findings and recommendations with the patient.  RECOMMENDATION(S):  SHORT TERM FOLLOW-UP DIAGNOSTIC MAMMOGRAM LEFT BREAST IN 6 MONTHS.   BIRADS:  DIAGNOSTIC CATEGORY 3--PROBABLY BENIGN FINDING.   BREAST COMPOSITION: Heterogeneously dense,which may obscure small masses.  " PLEASE NOTE:  A NORMAL MAMMOGRAM DOES NOT EXCLUDE THE POSSIBILITY OF BREAST CANCER. ANY CLINICALLY SUSPICIOUS PALPABLE LUMP SHOULD BE BIOPSIED.      LIAN MCMANUS MD       Electronically Signed and Approved By: LIAN MCMANUS MD on 8/08/2023 at 12:53             US Breast Left Limited    Result Date: 8/8/2023  Probably benign diagnostic left mammogram and targeted left breast ultrasound.  I discussed findings and recommendations with the patient.  RECOMMENDATION(S):  SHORT TERM FOLLOW-UP DIAGNOSTIC MAMMOGRAM LEFT BREAST IN 6 MONTHS.   BIRADS:  DIAGNOSTIC CATEGORY 3--PROBABLY BENIGN FINDING.   BREAST COMPOSITION: Heterogeneously dense,which may obscure small masses.  PLEASE NOTE:  A NORMAL MAMMOGRAM DOES NOT EXCLUDE THE POSSIBILITY OF BREAST CANCER. ANY CLINICALLY SUSPICIOUS PALPABLE LUMP SHOULD BE BIOPSIED.      LIAN MCMANUS MD       Electronically Signed and Approved By: LIAN MCMANUS MD on 8/08/2023 at 12:53             Mammo Screening Digital Tomosynthesis Bilateral With CAD    Result Date: 7/28/2023   One view asymmetry in the lateral far posterior left breast on the XCC view.  Repeat routine views are recommended to include all of the posterior soft tissue is recommended as well as possible spot compression view of the left breast.  Images are marked for reference.  Ultrasound may be helpful if warranted after the additional mammographic evaluation if deemed necessary at that time by the interpreting radiologist.  RECOMMENDATION(S):  ADDITIONAL MAMMOGRAPHIC VIEWS REQUIRED: LEFT BREAST  --NEED ADDITIONAL IMAGING EVALUATION.   ULTRASOUND: LEFT BREAST  --NEED ADDITIONAL IMAGING EVALUATION.   BIRADS:  DIAGNOSTIC CATEGORY 0--INCOMPLETE: NEEDS ADDITIONAL IMAGING EVALUATION.   BREAST COMPOSITION: Heterogeneously dense,which may obscure small masses.  PLEASE NOTE:  A NORMAL MAMMOGRAM DOES NOT EXCLUDE THE POSSIBILITY OF BREAST CANCER. ANY CLINICALLY SUSPICIOUS PALPABLE LUMP SHOULD BE BIOPSIED.      CAROLINA QUAN DO        Electronically Signed and Approved By: CAROLINA QUAN DO on 7/28/2023 at 9:02                The above data has been reviewed by SERINA Yates 09/26/2023 08:44 EDT.          Patient Care Team:  Danyell Sandy APRN as PCP - General (Internal Medicine)  Leora Montes DO as Consulting Physician (Endocrinology)            ASSESSMENT & PLAN    Diagnoses and all orders for this visit:    1. Essential hypertension  Comments:  stable on lisinopril 10mg and hctz 25mg, continue  Assessment & Plan:  Blood pressure is well controlled.  She is needing refill today.  BMP placed previously to recheck potassium.    Orders:  -     hydroCHLOROthiazide (HYDRODIURIL) 25 MG tablet; Take 1 tablet by mouth Daily.  Dispense: 90 tablet; Refill: 1  -     lisinopril (PRINIVIL,ZESTRIL) 10 MG tablet; Take 1 tablet by mouth Daily.  Dispense: 90 tablet; Refill: 1    2. Obesity (BMI 30.0-34.9)  Assessment & Plan:  Patient's (Body mass index is 33.36 kg/m².) indicates that they are obese (BMI >30) with health conditions that include hypertension and dyslipidemias . Weight is improving with treatment. BMI  is above average; BMI management plan is completed. We discussed portion control and increasing exercise. She has not been able to start wegovy yet but does plan to once it becomes in stock at the pharmacy.  She is on phentermine and tolerating well.  Continue current regimen, start wegovy when able.     Orders:  -     phentermine 37.5 MG capsule; Take 1 capsule by mouth Every Morning.  Dispense: 30 capsule; Refill: 0         Tobacco Use: Low Risk     Smoking Tobacco Use: Never    Smokeless Tobacco Use: Never    Passive Exposure: Not on file       Follow Up     Return in about 4 weeks (around 10/24/2023) for Recheck.    Please note that portions of this note were completed with a voice recognition program.    Patient was given instructions and counseling regarding her condition or for health maintenance advice. Please see specific  information pulled into the AVS if appropriate.   I have reviewed information obtained and documented by others and I have confirmed the accuracy of this documented note.    SERINA Yates

## 2023-09-26 NOTE — ASSESSMENT & PLAN NOTE
Blood pressure is well controlled.  She is needing refill today.  BMP placed previously to recheck potassium.  
Patient's (Body mass index is 33.36 kg/m².) indicates that they are obese (BMI >30) with health conditions that include hypertension and dyslipidemias . Weight is improving with treatment. BMI  is above average; BMI management plan is completed. We discussed portion control and increasing exercise. She has not been able to start wegovy yet but does plan to once it becomes in stock at the pharmacy.  She is on phentermine and tolerating well.  Continue current regimen, start wegovy when able.   
none

## 2023-10-30 ENCOUNTER — TELEMEDICINE (OUTPATIENT)
Dept: INTERNAL MEDICINE | Facility: CLINIC | Age: 38
End: 2023-10-30
Payer: COMMERCIAL

## 2023-10-30 VITALS — WEIGHT: 207 LBS | BODY MASS INDEX: 32.42 KG/M2

## 2023-10-30 DIAGNOSIS — E66.9 OBESITY (BMI 30.0-34.9): Primary | ICD-10-CM

## 2023-10-30 PROCEDURE — 99213 OFFICE O/P EST LOW 20 MIN: CPT

## 2023-10-30 RX ORDER — PHENTERMINE HYDROCHLORIDE 37.5 MG/1
37.5 CAPSULE ORAL EVERY MORNING
Qty: 30 CAPSULE | Refills: 0 | Status: SHIPPED | OUTPATIENT
Start: 2023-10-30

## 2023-10-30 NOTE — PROGRESS NOTES
Mode of Visit: Video  Location of patient: home  You have chosen to receive care through a telehealth visit.  Does the patient consent to use a video/audio connection for your medical care today? Yes  The visit included audio and video interaction. No technical issues occurred during this visit.     Chief Complaint  Weight Loss (2 year old female for a 4 week follow up Phentermine. States she has been tolerating well )    Subjective          Ofelia Barrera presents to [facility]  History of Present Illness  Follow up on weight.     Objective   Vital Signs:   Wt 93.9 kg (207 lb)   BMI 32.42 kg/m²     Physical Exam   Constitutional: She appears well-developed and well-nourished.   HENT:   Head: Normocephalic.   Eyes: EOM are normal.   Neck: Neck normal appearance.  Pulmonary/Chest: Effort normal.  No respiratory distress.  Neurological: She is alert.   Skin: Skin is warm and dry.   Psychiatric: She has a normal mood and affect.     Result Review :                 Assessment and Plan    Diagnoses and all orders for this visit:    1. Obesity (BMI 30.0-34.9) (Primary)  Assessment & Plan:  Patient's (Body mass index is 32.42 kg/m².) indicates that they are obese (BMI >30) with health conditions that include hypertension . Weight is improving with lifestyle modifications and phentermine.  She has not been able to start Wegovy yet due to supply issues.  She is down about 21 pounds on the phentermine.  She is tolerating it well. She denies any side effects.  Continue current regimen for now.   Recommend increasing exercise and healthy diet.       Orders:  -     phentermine 37.5 MG capsule; Take 1 capsule by mouth Every Morning.  Dispense: 30 capsule; Refill: 0        Follow Up   Return in about 4 weeks (around 11/27/2023) for Recheck.  Patient was given instructions and counseling regarding her condition or for health maintenance advice. Please see specific information pulled into the AVS  if appropriate.

## 2023-10-30 NOTE — ASSESSMENT & PLAN NOTE
Patient's (Body mass index is 32.42 kg/m².) indicates that they are obese (BMI >30) with health conditions that include hypertension . Weight is improving with lifestyle modifications and phentermine.  She has not been able to start Wegovy yet due to supply issues.  She is down about 21 pounds on the phentermine.  She is tolerating it well. She denies any side effects.  Continue current regimen for now.   Recommend increasing exercise and healthy diet.

## 2023-11-27 ENCOUNTER — TELEMEDICINE (OUTPATIENT)
Dept: INTERNAL MEDICINE | Facility: CLINIC | Age: 38
End: 2023-11-27
Payer: COMMERCIAL

## 2023-11-27 VITALS — BODY MASS INDEX: 32.18 KG/M2 | HEIGHT: 67 IN | TEMPERATURE: 97.8 F | WEIGHT: 205 LBS

## 2023-11-27 DIAGNOSIS — E66.9 OBESITY (BMI 30.0-34.9): Primary | ICD-10-CM

## 2023-11-27 PROCEDURE — 99213 OFFICE O/P EST LOW 20 MIN: CPT

## 2023-11-27 RX ORDER — PHENTERMINE HYDROCHLORIDE 37.5 MG/1
37.5 CAPSULE ORAL EVERY MORNING
Qty: 30 CAPSULE | Refills: 0 | Status: SHIPPED | OUTPATIENT
Start: 2023-11-27

## 2023-11-27 NOTE — PROGRESS NOTES
"Chief Complaint  No chief complaint on file.    History of Present Illness  SUBJECTIVE  Ofelia Barrera presents to [facility] follow up on weight.        Past Medical History:   Diagnosis Date    Back pain     Calculus of kidney     Essential hypertension     Lumbar disc displacement without myelopathy 11/19/2013    Sciatica 11/19/2013    recurrent symptoms in the left S1 dist.    Weight gain       Family History   Problem Relation Age of Onset    Other Other         Spine problems      Past Surgical History:   Procedure Laterality Date    KIDNEY STONE SURGERY      lithotripsy    OTHER SURGICAL HISTORY  10/28/2013    discectomy        Current Outpatient Medications:     hydroCHLOROthiazide (HYDRODIURIL) 25 MG tablet, Take 1 tablet by mouth Daily., Disp: 90 tablet, Rfl: 1    levonorgestrel (MIRENA) 20 MCG/24HR IUD, 1 each by Intrauterine route 1 (One) Time., Disp: , Rfl:     lisinopril (PRINIVIL,ZESTRIL) 10 MG tablet, Take 1 tablet by mouth Daily., Disp: 90 tablet, Rfl: 1    phentermine 37.5 MG capsule, Take 1 capsule by mouth Every Morning., Disp: 30 capsule, Rfl: 0    potassium chloride 10 MEQ CR tablet, Take 1 tablet by mouth Daily., Disp: 30 tablet, Rfl: 5    Semaglutide-Weight Management 0.25 MG/0.5ML solution auto-injector, Inject 0.25 mg under the skin into the appropriate area as directed 1 (One) Time Per Week. (Patient not taking: Reported on 9/26/2023), Disp: 2 mL, Rfl: 0    OBJECTIVE  Vital Signs:   There were no vitals taken for this visit.   Estimated body mass index is 32.42 kg/m² as calculated from the following:    Height as of 9/26/23: 170.2 cm (67\").    Weight as of 10/30/23: 93.9 kg (207 lb).     Wt Readings from Last 3 Encounters:   10/30/23 93.9 kg (207 lb)   09/26/23 96.6 kg (213 lb)   08/24/23 98 kg (216 lb)     BP Readings from Last 3 Encounters:   09/26/23 122/80   08/24/23 112/70   08/10/23 120/74       Physical Exam     Result Review    {[facility] [place] " Ambulatory Labs (Optional):11267}    Mammo Diagnostic Digital Tomosynthesis Left With CAD    Result Date: 8/8/2023  Probably benign diagnostic left mammogram and targeted left breast ultrasound.  I discussed findings and recommendations with the patient.  RECOMMENDATION(S):  SHORT TERM FOLLOW-UP DIAGNOSTIC MAMMOGRAM LEFT BREAST IN 6 MONTHS.   BIRADS:  DIAGNOSTIC CATEGORY 3--PROBABLY BENIGN FINDING.   BREAST COMPOSITION: Heterogeneously dense,which may obscure small masses.  PLEASE NOTE:  A NORMAL MAMMOGRAM DOES NOT EXCLUDE THE POSSIBILITY OF BREAST CANCER. ANY CLINICALLY SUSPICIOUS PALPABLE LUMP SHOULD BE BIOPSIED.      LIAN MCMANUS MD       Electronically Signed and Approved By: LIAN MCMANUS MD on 8/08/2023 at 12:53             US Breast Left Limited    Result Date: 8/8/2023  Probably benign diagnostic left mammogram and targeted left breast ultrasound.  I discussed findings and recommendations with the patient.  RECOMMENDATION(S):  SHORT TERM FOLLOW-UP DIAGNOSTIC MAMMOGRAM LEFT BREAST IN 6 MONTHS.   BIRADS:  DIAGNOSTIC CATEGORY 3--PROBABLY BENIGN FINDING.   BREAST COMPOSITION: Heterogeneously dense,which may obscure small masses.  PLEASE NOTE:  A NORMAL MAMMOGRAM DOES NOT EXCLUDE THE POSSIBILITY OF BREAST CANCER. ANY CLINICALLY SUSPICIOUS PALPABLE LUMP SHOULD BE BIOPSIED.      LIAN MCMANUS MD       Electronically Signed and Approved By: LIAN MCMANUS MD on 8/08/2023 at 12:53                The above data has been reviewed by SERINA Yates 11/27/2023 09:14 EST.          Patient Care Team:  Danyell Sandy APRN as PCP - General (Internal Medicine)  Leora Montes DO as Consulting Physician (Endocrinology)            ASSESSMENT & PLAN    There are no diagnoses linked to this encounter.     Tobacco Use: Low Risk  (10/30/2023)    Patient History     Smoking Tobacco Use: Never     Smokeless Tobacco Use: Never     Passive Exposure: Not on file       Follow Up     No follow-ups on file.    Please note that  portions of this note were completed with a voice recognition program.    Patient was given instructions and counseling regarding her condition or for health maintenance advice. Please see specific information pulled into the AVS if appropriate.   I have reviewed information obtained and documented by others and I have confirmed the accuracy of this documented note.    SERINA Yates

## 2023-11-27 NOTE — PROGRESS NOTES
"Mode of Visit: Video  Location of patient: home  You have chosen to receive care through a telehealth visit.  Does the patient consent to use a video/audio connection for your medical care today? Yes  The visit included audio and video interaction. No technical issues occurred during this visit.     Chief Complaint  Obesity (38 year old female for a follow up on weight loss. She is currently taking Phentermine, as Wegovy has been out of stock. She is tolerating Phentermine well. She has lost 6 pounds since last visit. )    Subjective          Ofelia Barrera presents to [facility]  History of Present Illness  Follow up on weight.   Objective   Vital Signs:   Temp 97.8 °F (36.6 °C) (Oral)   Ht 170.2 cm (67\")   Wt 93 kg (205 lb)   BMI 32.11 kg/m²     Physical Exam   Constitutional: She appears well-developed and well-nourished.   HENT:   Head: Normocephalic.   Eyes: EOM are normal.   Neck: Neck normal appearance.  Pulmonary/Chest: Effort normal.  No respiratory distress.  Neurological: She is alert.   Skin: Skin is warm and dry.   Psychiatric: She has a normal mood and affect.     Result Review :                 Assessment and Plan    Diagnoses and all orders for this visit:    1. Obesity (BMI 30.0-34.9) (Primary)  Assessment & Plan:  Patient's (Body mass index is 32.11 kg/m².) indicates that they are obese (BMI >30) with health conditions that include hypertension . Weight is improving with treatment. BMI  is above average; BMI management plan is completed. We discussed portion control and increasing exercise. She has lost a total of 23 pounds. She has lost about 6 lbs since last month.  We are planning to start GLP-1 once stock becomes available.  Will continue phentermine for now.  She will call the pharmacy and let us know if GLP-1 becomes available.  She is aware of the risks/benefits of the medications.       Orders:  -     phentermine 37.5 MG capsule; Take 1 capsule by " mouth Every Morning.  Dispense: 30 capsule; Refill: 0        Follow Up   Return if symptoms worsen or fail to improve.  Patient was given instructions and counseling regarding her condition or for health maintenance advice. Please see specific information pulled into the AVS if appropriate.

## 2023-11-27 NOTE — ASSESSMENT & PLAN NOTE
Patient's (Body mass index is 32.11 kg/m².) indicates that they are obese (BMI >30) with health conditions that include hypertension . Weight is improving with treatment. BMI  is above average; BMI management plan is completed. We discussed portion control and increasing exercise. She has lost a total of 23 pounds. She has lost about 6 lbs since last month.  We are planning to start GLP-1 once stock becomes available.  Will continue phentermine for now.  She will call the pharmacy and let us know if GLP-1 becomes available.  She is aware of the risks/benefits of the medications.

## 2023-12-12 NOTE — TELEPHONE ENCOUNTER
[facility] - Initial Visit Progress Note    Patient  Name: Arjun Vickers, : 1991    Date:  2023       Service Type:  Mental Health Initial Visit     Visit Start Time: 140 Visit End Time:     Session Length:   TC Session Length: 45 (38-52 Minutes)    Visit #: 1    Attendees:  TC Attendees: Client alone    Service Modality:  Service Modality: Video Visit:      Provider verified identity through the following two step process.  Patient provided:  Patient  and Patient address    Telemedicine Visit: The patient's condition can be safely assessed and treated via synchronous audio and visual telemedicine encounter.      Reason for Telemedicine Visit: Patient has requested telehealth visit    Originating Site (Patient Location): Patient's home    Distant Site (Provider Location): Provider Remote Setting- Home Office    Consent:  The patient/guardian has verbally consented to: the potential risks and benefits of telemedicine (video visit) versus in person care; bill my insurance or make self-payment for services provided; and responsibility for payment of non-covered services.     Patient would like the video invitation sent by:  My Chart    Mode of Communication:  Video Conference via Amwell    Distant Location (Provider):  Off-site    As the provider I attest to compliance with applicable laws and regulations related to telemedicine.    Informed Consent and Assessment Methods    This provider and patient discussed HIPAA, and the limits of confidentiality; including mandated reporting, the possibility of collaborative discussions with patient's primary care provider and the multidisciplinary team in the MH clinic during consultation.  Discussed the no show policy, and the benefits and possible unintended consequences of therapy.  Patient indicated understanding [facility] services are short term, solution focused, until a referral can be made and patient can bridge to long term therapy.  VM not set up.    " Patient verbally indicated understanding the informed consent.         Presenting Concerns/  Current Stressors:  Arjun Vickers is a 32 year old identified male who was referred to the Transition Clinic by psychiatry for higher level of care and questioning if he need PHP.  Arjun Vickers reports he has always had anxiety. He states \"it has ramped up the last few months\".  He has had 3 panic attacks.  He states his psychiatrist recommended a higher level of care.  There was inadequate time this session to complete a full history for the DA Patient will return on 12/19/2023.  Arjun stated he wonders if he has BPAD, Panic disorder, heart disease; due to alcohol use; and depression.  He stated he wonders if he has hypochondriasis.  He was able to process in session it is best to complete a diagnostic assessment and reach a conclusion to diagnosis and next steps.      ASSESSMENT:    Required Screenings: The following assessments were completed by patient for this visit:  PHQ9:       7/21/2020     3:00 PM 8/19/2020    12:00 PM 9/6/2023     5:55 PM 11/15/2023     3:13 PM 12/11/2023     3:32 PM   PHQ-9 SCORE   PHQ-9 Total Score MyChart    13 (Moderate depression) 11 (Moderate depression)   PHQ-9 Total Score 10 5 1 13 11     GAD7:       7/21/2020     3:00 PM 8/19/2020     1:00 PM 9/6/2023     5:55 PM 11/9/2023     1:55 PM 12/11/2023     3:31 PM   TOM-7 SCORE   Total Score    20 (severe anxiety) 16 (severe anxiety)   Total Score 7 6 13 20 16     CAGE-AID:       11/9/2023     1:57 PM 12/11/2023     3:32 PM   CAGE-AID Total Score   Total Score 1 1   Total Score MyChart 1 (A total score of 2 or greater is considered clinically significant) 1 (A total score of 2 or greater is considered clinically significant)     PROMIS 10-Global Health (all questions and answers displayed):       11/9/2023     1:56 PM 12/11/2023     3:31 PM   PROMIS 10   In general, would you say your health is: Good Good   In general, would you say your quality of " life is: Fair Good   In general, how would you rate your physical health? Good Good   In general, how would you rate your mental health, including your mood and your ability to think? Poor Fair   In general, how would you rate your satisfaction with your social activities and relationships? Good Excellent   In general, please rate how well you carry out your usual social activities and roles Fair Good   To what extent are you able to carry out your everyday physical activities such as walking, climbing stairs, carrying groceries, or moving a chair? A little Completely   In the past 7 days, how often have you been bothered by emotional problems such as feeling anxious, depressed, or irritable? Always Always   In the past 7 days, how would you rate your fatigue on average? Moderate Moderate   In the past 7 days, how would you rate your pain on average, where 0 means no pain, and 10 means worst imaginable pain? 4 2   In general, would you say your health is: 3 3   In general, would you say your quality of life is: 2 3   In general, how would you rate your physical health? 3 3   In general, how would you rate your mental health, including your mood and your ability to think? 1 2   In general, how would you rate your satisfaction with your social activities and relationships? 3 5   In general, please rate how well you carry out your usual social activities and roles. (This includes activities at home, at work and in your community, and responsibilities as a parent, child, spouse, employee, friend, etc.) 2 3   To what extent are you able to carry out your everyday physical activities such as walking, climbing stairs, carrying groceries, or moving a chair? 2 5   In the past 7 days, how often have you been bothered by emotional problems such as feeling anxious, depressed, or irritable? 5 5   In the past 7 days, how would you rate your fatigue on average? 3 3   In the past 7 days, how would you rate your pain on average,  where 0 means no pain, and 10 means worst imaginable pain? 4 2   Global Mental Health Score 7 11   Global Physical Health Score 11 15   PROMIS TOTAL - SUBSCORES 18 26     [place] Suicide Severity Rating Scale (Lifetime/Recent)      12/11/2023     3:32 PM   [place] Suicide Severity Rating (Lifetime/Recent)   1. Wish to be Dead (Past 1 Month) N   2. Non-Specific Active Suicidal Thoughts (Past 1 Month) N   Calculated C-SSRS Risk Score (Lifetime/Recent) No Risk Indicated       Mental Status Assessment:  Appearance:   Appropriate   Eye Contact:   Good   Psychomotor Behavior: Restless   Attitude:   Cooperative   Orientation:   Person Place Time Situation  Speech     Rate / Production:  Normal/ Responsive     Volume:   Normal   Mood:    Anxious   Affect:    Appropriate   Thought Content:  Clear   Thought Form:  Coherent  Goal Directed   Insight:    Fair       Safety Issues and Plan for Safety and Risk Management:     Patient denies current fears or concerns for personal safety.  Patient denies current or recent suicidal ideation or behaviors.  Patient denies current or recent homicidal ideation or behaviors.  Patient denies current or recent self injurious behavior or ideation.  Patient denies other safety concerns.  Recommended that patient call 911 or go to the local ED should there be a change in any of these risk factors.  Patient reports there are firearms in the house. The firearms are secured in a locked space.     Diagnostic Criteria:  Generalized Anxiety Disorder  A. Excessive anxiety and worry about a number of events or activities (such as work or school performance).   B. The person finds it difficult to control the worry.  C. Select 3 or more symptoms (required for diagnosis). Only one item is required in children.   - Restlessness or feeling keyed up or on edge.    - Being easily fatigued.    - Difficulty concentrating or mind going blank.    - Irritability.    - Muscle tension.    - Sleep disturbance  (difficulty falling or staying asleep, or restless unsatisfying sleep).   D. The focus of the anxiety and worry is not confined to features of an Axis I disorder.  E. The anxiety, worry, or physical symptoms cause clinically significant distress or impairment in social, occupational, or other important areas of functioning.   F. The disturbance is not due to the direct physiological effects of a substance (e.g., a drug of abuse, a medication) or a general medical condition (e.g., hyperthyroidism) and does not occur exclusively during a Mood Disorder, a Psychotic Disorder, or a Pervasive Developmental Disorder.    DSM5 Diagnoses: (Sustained by DSM5 Criteria Listed Above)  Diagnoses: 300.22 (F40.00) Agoraphobia  300.09 (F41.8) Other Specified Anxiety Disorder   Psychosocial & Contextual Factors: unemployed; fears many health issues. Minimal family support.    Treatment Objective(s) Addressed in This Session:            Identify stressors     Identify skills to reduce stress    Identify symptoms and stressors related to trauma experiences   Managing negative thoughts related to past experiences.  Begin diagnostic assessment for programmatic care    Intervention:                Solution Focused Brief Therapy:                 Strengthen coping skills   Validate and strengthen self resiliency.                Reduce symptoms of depression, sadness, low  self-esteem.    Maintain stability until bridged to [facility].   Collateral Reports Completed:      TC Collateral: [facility] electronic medical records reviewed. and No Collateral obtained.    DATA:  Interactive Complexity: No  Crisis: No    PLAN: (Homework, other):  Provider will continue Diagnostic Assessment. Initial Treatment will focus on: Anxiety - identify stressors and introduce skills to alievate symptoms. .  2.   Provider recommended the following referrals:  PHP.    3.   Information in this assessment was obtained from the medical record and provided by  patient who is a good historian.   4.   Follow up scheduled:  12/19/2023        Patient was given the following to do until next session:  complete forms sent through My Chart  5.  Anticipated Discharge: Anticipated Discharge Time: < 1 Month     Procedure Code:  Psychotherapy (with patient) - 45 [CPT 46736]    Suicide Risk and Safety Concerns were assessed for. Patient meets the following risk assessment and triage: Patient denied any current/recent/lifetime history of suicidal ideation and/or behaviors.  No safety plan indicated at this time.     Belkis Rubio, REMYSW

## 2024-01-04 ENCOUNTER — TELEMEDICINE (OUTPATIENT)
Dept: INTERNAL MEDICINE | Facility: CLINIC | Age: 39
End: 2024-01-04
Payer: COMMERCIAL

## 2024-01-04 VITALS — TEMPERATURE: 98.6 F

## 2024-01-04 DIAGNOSIS — J40 BRONCHITIS: Primary | ICD-10-CM

## 2024-01-04 DIAGNOSIS — H92.03 OTALGIA OF BOTH EARS: ICD-10-CM

## 2024-01-04 DIAGNOSIS — H69.93 DYSFUNCTION OF BOTH EUSTACHIAN TUBES: ICD-10-CM

## 2024-01-04 PROCEDURE — 99213 OFFICE O/P EST LOW 20 MIN: CPT

## 2024-01-04 RX ORDER — AZITHROMYCIN 250 MG/1
TABLET, FILM COATED ORAL
Qty: 6 TABLET | Refills: 0 | Status: SHIPPED | OUTPATIENT
Start: 2024-01-04

## 2024-01-04 RX ORDER — PREDNISONE 20 MG/1
40 TABLET ORAL DAILY
Qty: 10 TABLET | Refills: 0 | Status: SHIPPED | OUTPATIENT
Start: 2024-01-04 | End: 2024-01-09

## 2024-01-04 NOTE — PROGRESS NOTES
Mode of Visit: Video  Location of patient: home  You have chosen to receive care through a telehealth visit.  Does the patient consent to use a video/audio connection for your medical care today? Yes  The visit included audio and video interaction. No technical issues occurred during this visit.     Chief Complaint  Earache and Ear Fullness (37 yo female is doing a telehealth visit for having bilateral ear pain and fullness. She reported having drainage in her left ear. No other symptoms reported at this time. )    Subjective          Ofelia Barrera presents to [facility]  History of Present Illness  Patient complains of otalgia and fullness. She is also having congestion, sinus pressure, congestion. She states she feels like she is under water and is having dizziness.  Patient states that she also has had a productive cough for several weeks.  She denies any fever, chills, myalgias, nausea, vomiting, diarrhea.   Left is worse than the right.  She has been taking an antihistamine daily.    Objective   Vital Signs:   Temp 98.6 °F (37 °C) (Skin)     Physical Exam   Constitutional: She appears well-developed and well-nourished.   HENT:   Head: Normocephalic.   Eyes: EOM are normal.   Neck: Neck normal appearance.  Pulmonary/Chest: Effort normal.  No respiratory distress.  Neurological: She is alert.   Skin: Skin is warm and dry.   Psychiatric: She has a normal mood and affect.     Result Review :                 Assessment and Plan    Diagnoses and all orders for this visit:    1. Bronchitis (Primary)  -     predniSONE (DELTASONE) 20 MG tablet; Take 2 tablets by mouth Daily for 5 days.  Dispense: 10 tablet; Refill: 0  -     azithromycin (Zithromax Z-Bib) 250 MG tablet; Take 2 tablets by mouth on day 1, then 1 tablet daily on days 2-5  Dispense: 6 tablet; Refill: 0    2. Otalgia of both ears  -     predniSONE (DELTASONE) 20 MG tablet; Take 2 tablets by mouth Daily for 5 days.   Dispense: 10 tablet; Refill: 0  -     azithromycin (Zithromax Z-Bib) 250 MG tablet; Take 2 tablets by mouth on day 1, then 1 tablet daily on days 2-5  Dispense: 6 tablet; Refill: 0    3. Dysfunction of both eustachian tubes    Patient will also add in flonase and continue antihistamine as well.       Follow Up   Return if symptoms worsen or fail to improve.  Patient was given instructions and counseling regarding her condition or for health maintenance advice. Please see specific information pulled into the AVS if appropriate.        CDU

## 2024-02-08 ENCOUNTER — DOCUMENTATION (OUTPATIENT)
Dept: INTERNAL MEDICINE | Facility: CLINIC | Age: 39
End: 2024-02-08
Payer: COMMERCIAL

## 2024-02-08 RX ORDER — FLUCONAZOLE 150 MG/1
150 TABLET ORAL ONCE
Qty: 1 TABLET | Refills: 0 | Status: SHIPPED | OUTPATIENT
Start: 2024-02-08 | End: 2024-02-09

## 2024-02-13 RX ORDER — FLUCONAZOLE 150 MG/1
150 TABLET ORAL ONCE
Qty: 2 TABLET | Refills: 0 | Status: SHIPPED | OUTPATIENT
Start: 2024-02-13 | End: 2024-02-15

## 2024-02-17 DIAGNOSIS — I10 ESSENTIAL HYPERTENSION: ICD-10-CM

## 2024-02-19 RX ORDER — LISINOPRIL 10 MG/1
10 TABLET ORAL DAILY
Qty: 90 TABLET | Refills: 1 | Status: SHIPPED | OUTPATIENT
Start: 2024-02-19

## 2024-02-19 RX ORDER — HYDROCHLOROTHIAZIDE 25 MG/1
25 TABLET ORAL DAILY
Qty: 90 TABLET | Refills: 1 | Status: SHIPPED | OUTPATIENT
Start: 2024-02-19

## 2024-03-28 ENCOUNTER — TELEMEDICINE (OUTPATIENT)
Dept: INTERNAL MEDICINE | Age: 39
End: 2024-03-28
Payer: COMMERCIAL

## 2024-03-28 VITALS — WEIGHT: 203 LBS | BODY MASS INDEX: 31.79 KG/M2

## 2024-03-28 DIAGNOSIS — E66.9 OBESITY (BMI 30.0-34.9): Primary | ICD-10-CM

## 2024-03-28 PROCEDURE — 99213 OFFICE O/P EST LOW 20 MIN: CPT

## 2024-03-28 RX ORDER — SEMAGLUTIDE 0.5 MG/.5ML
0.5 INJECTION, SOLUTION SUBCUTANEOUS WEEKLY
Qty: 2 ML | Refills: 0 | Status: SHIPPED | OUTPATIENT
Start: 2024-03-28

## 2024-03-28 NOTE — PROGRESS NOTES
Mode of Visit: Video  Location of patient: home  You have chosen to receive care through a telehealth visit.  Does the patient consent to use a video/audio connection for your medical care today? Yes  The visit included audio and video interaction. No technical issues occurred during this visit.     Chief Complaint  Weight Loss (Needs a refill on her weight loss medicine.)    Subjective          Ofelia Barrera presents to [facility]  Weight Loss      To discuss weight loss.     Objective   Vital Signs:   Wt 92.1 kg (203 lb)   BMI 31.79 kg/m²     Physical Exam   Constitutional: She appears well-developed and well-nourished.   HENT:   Head: Normocephalic.   Eyes: EOM are normal.   Neck: Neck normal appearance.  Pulmonary/Chest: Effort normal.  No respiratory distress.  Neurological: She is alert.   Skin: Skin is warm and dry.   Psychiatric: She has a normal mood and affect.     Result Review :                 Assessment and Plan    Diagnoses and all orders for this visit:    1. Obesity (BMI 30.0-34.9) (Primary)  Assessment & Plan:  Patient's (Body mass index is 31.79 kg/m².) indicates that they are obese (BMI >30) with health conditions that include hypertension . Weight is unchanged. BMI  is above average; BMI management plan is completed. We discussed portion control and increasing exercise.   Patient was 208 lbs prior to starting wegovy. She has lost about 5 lbs over the last 3 weeks.  She states that she is tolerating the wegovy well. She denies any side effects.  Will increase wegovy to 0.5 mg weekly.   She will let me know how she is tolerating the 0.5 mg weekly.     Orders:  -     Semaglutide-Weight Management (Wegovy) 0.5 MG/0.5ML solution auto-injector; Inject 0.5 mL under the skin into the appropriate area as directed 1 (One) Time Per Week.  Dispense: 2 mL; Refill: 0        Follow Up   Return in about 4 months (around 7/28/2024) for Recheck, Annual physical.  Patient was  given instructions and counseling regarding her condition or for health maintenance advice. Please see specific information pulled into the AVS if appropriate.

## 2024-03-28 NOTE — ASSESSMENT & PLAN NOTE
Patient's (Body mass index is 31.79 kg/m².) indicates that they are obese (BMI >30) with health conditions that include hypertension . Weight is unchanged. BMI  is above average; BMI management plan is completed. We discussed portion control and increasing exercise.   Patient was 208 lbs prior to starting wegovy. She has lost about 5 lbs over the last 3 weeks.  She states that she is tolerating the wegovy well. She denies any side effects.  Will increase wegovy to 0.5 mg weekly.   She will let me know how she is tolerating the 0.5 mg weekly.

## 2024-05-17 DIAGNOSIS — E87.6 HYPOKALEMIA: ICD-10-CM

## 2024-05-17 RX ORDER — POTASSIUM CHLORIDE 750 MG/1
10 TABLET, FILM COATED, EXTENDED RELEASE ORAL DAILY
Qty: 30 TABLET | Refills: 5 | Status: SHIPPED | OUTPATIENT
Start: 2024-05-17

## 2024-08-05 ENCOUNTER — OFFICE VISIT (OUTPATIENT)
Dept: INTERNAL MEDICINE | Age: 39
End: 2024-08-05
Payer: COMMERCIAL

## 2024-08-05 VITALS
SYSTOLIC BLOOD PRESSURE: 115 MMHG | TEMPERATURE: 97.3 F | HEIGHT: 68 IN | DIASTOLIC BLOOD PRESSURE: 75 MMHG | HEART RATE: 67 BPM | OXYGEN SATURATION: 97 % | RESPIRATION RATE: 16 BRPM | BODY MASS INDEX: 31.49 KG/M2 | WEIGHT: 207.8 LBS

## 2024-08-05 DIAGNOSIS — Z00.00 ANNUAL PHYSICAL EXAM: ICD-10-CM

## 2024-08-05 DIAGNOSIS — I10 ESSENTIAL HYPERTENSION: Primary | ICD-10-CM

## 2024-08-05 DIAGNOSIS — E61.1 IRON DEFICIENCY: ICD-10-CM

## 2024-08-05 DIAGNOSIS — R53.83 OTHER FATIGUE: ICD-10-CM

## 2024-08-05 DIAGNOSIS — E66.9 OBESITY (BMI 30.0-34.9): ICD-10-CM

## 2024-08-05 DIAGNOSIS — E87.6 HYPOKALEMIA: ICD-10-CM

## 2024-08-05 DIAGNOSIS — E55.9 VITAMIN D DEFICIENCY: ICD-10-CM

## 2024-08-05 PROCEDURE — 99396 PREV VISIT EST AGE 40-64: CPT

## 2024-08-05 RX ORDER — POTASSIUM CHLORIDE 20 MEQ/15ML
20 SOLUTION ORAL DAILY
Qty: 450 ML | Refills: 2 | Status: SHIPPED | OUTPATIENT
Start: 2024-08-05 | End: 2024-09-04

## 2024-08-05 NOTE — ASSESSMENT & PLAN NOTE
Blood pressure is well controlled  She remains on hydrochlorothiazide and potassium  Continue current regiemn.

## 2024-08-05 NOTE — ASSESSMENT & PLAN NOTE
Pap-UTD   SBE-counseled  Flu vaccine-declines  Mammogram-ordered  Covid-19 vaccines-initial series, plus 1 booster.   Recommend regular exercise, healthy diet, routine dental/eye exams.  Wears seat belt  Denies tobacco use.   Alcohol socially.

## 2024-08-05 NOTE — ASSESSMENT & PLAN NOTE
Iron Def  She was recently started on Iron supplementation.  She is very tired and fatigued.  She does not have copy of those labs with her.  Will recheck her iron profile and ferritin to see if it is improving.

## 2024-08-05 NOTE — PROGRESS NOTES
"Chief Complaint  Annual Exam and Labs Only    History of Present Illness  SUBJECTIVE  Ofelia Barrera presents to [facility] for her annual physical.    She was told she has iron deficiency-she recently started iron supplement    Patient reports that she is very tired and fatigued.    She denies any chest pain, shortness of breath, palpitations, nausea, vomiting, diarrhea, change in bowel or bladder habits.    Past Medical History:   Diagnosis Date    Back pain     Calculus of kidney     Essential hypertension     Lumbar disc displacement without myelopathy 11/19/2013    Sciatica 11/19/2013    recurrent symptoms in the left S1 dist.    Weight gain       Family History   Problem Relation Age of Onset    Other Other         Spine problems      Past Surgical History:   Procedure Laterality Date    KIDNEY STONE SURGERY      lithotripsy    OTHER SURGICAL HISTORY  10/28/2013    discectomy        Current Outpatient Medications:     ferrous sulfate 325 (65 Fe) MG tablet, Take one tablet by mouth daily., Disp: 30 tablet, Rfl: 0    hydroCHLOROthiazide 25 MG tablet, Take 1 tablet by mouth Daily., Disp: 90 tablet, Rfl: 1    levonorgestrel (MIRENA) 20 MCG/24HR IUD, 1 each by Intrauterine route 1 (One) Time., Disp: , Rfl:     lisinopril (PRINIVIL,ZESTRIL) 10 MG tablet, Take 1 tablet by mouth Daily., Disp: 90 tablet, Rfl: 1    Semaglutide-Weight Management (Wegovy) 1.7 MG/0.75ML solution auto-injector, Inject 1.7 Milligram subcutaneously Every seven days, Disp: 9 mL, Rfl: 0    vitamin D3 125 MCG (5000 UT) capsule capsule, Take one capsule by mouth daily., Disp: 30 capsule, Rfl: 0    Potassium Chloride 10 % solution, Take 15 mL by mouth Daily for 30 days., Disp: 450 mL, Rfl: 2    OBJECTIVE  Vital Signs:   /75 (BP Location: Left arm, Patient Position: Sitting, Cuff Size: Large Adult)   Pulse 67   Temp 97.3 °F (36.3 °C) (Temporal)   Resp 16   Ht 172.7 cm (68\")   Wt 94.3 kg (207 lb 12.8 " "oz)   SpO2 97%   BMI 31.60 kg/m²    Estimated body mass index is 31.6 kg/m² as calculated from the following:    Height as of this encounter: 172.7 cm (68\").    Weight as of this encounter: 94.3 kg (207 lb 12.8 oz).     Wt Readings from Last 3 Encounters:   08/05/24 94.3 kg (207 lb 12.8 oz)   07/28/24 94.8 kg (209 lb 1.6 oz)   03/28/24 92.1 kg (203 lb)     BP Readings from Last 3 Encounters:   08/05/24 115/75   07/28/24 116/82   09/26/23 122/80       Physical Exam  Vitals and nursing note reviewed.   Constitutional:       Appearance: Normal appearance.   HENT:      Head: Normocephalic.      Right Ear: Tympanic membrane normal.      Left Ear: Tympanic membrane normal.   Eyes:      Extraocular Movements: Extraocular movements intact.      Conjunctiva/sclera: Conjunctivae normal.   Cardiovascular:      Rate and Rhythm: Normal rate and regular rhythm.      Heart sounds: Normal heart sounds. No murmur heard.  Pulmonary:      Effort: Pulmonary effort is normal.      Breath sounds: Normal breath sounds. No wheezing or rales.   Abdominal:      General: Bowel sounds are normal.      Palpations: Abdomen is soft.      Tenderness: There is no abdominal tenderness. There is no guarding.   Musculoskeletal:         General: No swelling. Normal range of motion.      Cervical back: Normal range of motion and neck supple.   Skin:     General: Skin is warm and dry.   Neurological:      General: No focal deficit present.      Mental Status: She is alert and oriented to person, place, and time. Mental status is at baseline.   Psychiatric:         Mood and Affect: Mood normal.         Behavior: Behavior normal.         Thought Content: Thought content normal.         Judgment: Judgment normal.          Result Review        No Images in the past 120 days found..     The above data has been reviewed by SERINA Yates 08/05/2024 10:08 EDT.          Patient Care Team:  Danyell Sandy APRN as PCP - General (Internal Medicine)  Jyoti, " Leora Nicole DO as Consulting Physician (Endocrinology)            ASSESSMENT & PLAN    Diagnoses and all orders for this visit:    1. Essential hypertension (Primary)  Assessment & Plan:  Blood pressure is well controlled  She remains on hydrochlorothiazide and potassium  Continue current regiemn.       2. Annual physical exam  Assessment & Plan:  Pap-UTD   SBE-counseled  Flu vaccine-declines  Mammogram-ordered  Covid-19 vaccines-initial series, plus 1 booster.   Recommend regular exercise, healthy diet, routine dental/eye exams.  Wears seat belt  Denies tobacco use.   Alcohol socially.     Orders:  -     CBC & Differential; Future  -     Comprehensive Metabolic Panel; Future  -     Lipid Panel; Future  -     TSH Rfx On Abnormal To Free T4; Future  -     Vitamin B12 & Folate; Future    3. Obesity (BMI 30.0-34.9)  Assessment & Plan:  Patient's (Body mass index is 31.6 kg/m².) indicates that they are obese (BMI >30) with health conditions that include hypertension . Weight is unchanged. BMI  is above average; BMI management plan is completed. We discussed portion control and increasing exercise. Patient is going to [facility].  She is currently on wegovy 1 mg weekly. She will move up to wegovy 1.7 mg weekly.   Continue current regimen.      4. Iron deficiency  Assessment & Plan:  Iron Def  She was recently started on Iron supplementation.  She is very tired and fatigued.  She does not have copy of those labs with her.  Will recheck her iron profile and ferritin to see if it is improving.    Orders:  -     Iron Profile; Future  -     Ferritin; Future  -     Occult Blood, Fecal By Immunoassay - Stool, Per Rectum; Future    5. Other fatigue    6. Vitamin D deficiency  -     Vitamin D,25-Hydroxy; Future    7. Hypokalemia  -     Potassium Chloride 10 % solution; Take 15 mL by mouth Daily for 30 days.  Dispense: 450 mL; Refill: 2         Tobacco Use: Low Risk  (8/5/2024)    Patient History     Smoking Tobacco  Use: Never     Smokeless Tobacco Use: Never     Passive Exposure: Not on file       Follow Up     Return in about 6 months (around 2/5/2025).    Please note that portions of this note were completed with a voice recognition program.    Patient was given instructions and counseling regarding her condition or for health maintenance advice. Please see specific information pulled into the AVS if appropriate.   I have reviewed information obtained and documented by others and I have confirmed the accuracy of this documented note.    SERINA Yates

## 2024-08-05 NOTE — ASSESSMENT & PLAN NOTE
Patient's (Body mass index is 31.6 kg/m².) indicates that they are obese (BMI >30) with health conditions that include hypertension . Weight is unchanged. BMI  is above average; BMI management plan is completed. We discussed portion control and increasing exercise. Patient is going to [facility].  She is currently on wegovy 1 mg weekly. She will move up to wegovy 1.7 mg weekly.   Continue current regimen.

## 2024-11-13 ENCOUNTER — TELEPHONE (OUTPATIENT)
Dept: INTERNAL MEDICINE | Age: 39
End: 2024-11-13
Payer: COMMERCIAL

## 2024-11-13 ENCOUNTER — LAB (OUTPATIENT)
Dept: LAB | Facility: HOSPITAL | Age: 39
End: 2024-11-13
Payer: COMMERCIAL

## 2024-11-13 DIAGNOSIS — R30.0 DYSURIA: ICD-10-CM

## 2024-11-13 DIAGNOSIS — R10.9 BILATERAL FLANK PAIN: ICD-10-CM

## 2024-11-13 DIAGNOSIS — I10 ESSENTIAL HYPERTENSION: ICD-10-CM

## 2024-11-13 DIAGNOSIS — R10.9 BILATERAL FLANK PAIN: Primary | ICD-10-CM

## 2024-11-13 LAB
BACTERIA UR QL AUTO: ABNORMAL /HPF
BILIRUB UR QL STRIP: NEGATIVE
CLARITY UR: CLEAR
COLOR UR: YELLOW
GLUCOSE UR STRIP-MCNC: NEGATIVE MG/DL
HGB UR QL STRIP.AUTO: NEGATIVE
HYALINE CASTS UR QL AUTO: ABNORMAL /LPF
KETONES UR QL STRIP: NEGATIVE
LEUKOCYTE ESTERASE UR QL STRIP.AUTO: ABNORMAL
NITRITE UR QL STRIP: NEGATIVE
PH UR STRIP.AUTO: 6.5 [PH] (ref 5–8)
PROT UR QL STRIP: NEGATIVE
RBC # UR STRIP: ABNORMAL /HPF
REF LAB TEST METHOD: ABNORMAL
SP GR UR STRIP: 1.02 (ref 1–1.03)
SQUAMOUS #/AREA URNS HPF: ABNORMAL /HPF
UROBILINOGEN UR QL STRIP: ABNORMAL
WBC # UR STRIP: ABNORMAL /HPF

## 2024-11-13 PROCEDURE — 81001 URINALYSIS AUTO W/SCOPE: CPT

## 2024-11-13 PROCEDURE — 87086 URINE CULTURE/COLONY COUNT: CPT

## 2024-11-13 RX ORDER — HYDROCHLOROTHIAZIDE 25 MG/1
25 TABLET ORAL DAILY
Qty: 90 TABLET | Refills: 1 | Status: SHIPPED | OUTPATIENT
Start: 2024-11-13

## 2024-11-13 RX ORDER — LISINOPRIL 10 MG/1
10 TABLET ORAL DAILY
Qty: 90 TABLET | Refills: 1 | Status: SHIPPED | OUTPATIENT
Start: 2024-11-13

## 2024-11-14 ENCOUNTER — TELEMEDICINE (OUTPATIENT)
Dept: INTERNAL MEDICINE | Age: 39
End: 2024-11-14
Payer: COMMERCIAL

## 2024-11-14 DIAGNOSIS — N30.00 ACUTE CYSTITIS WITHOUT HEMATURIA: Primary | ICD-10-CM

## 2024-11-14 LAB — BACTERIA SPEC AEROBE CULT: NO GROWTH

## 2024-11-14 PROCEDURE — 99213 OFFICE O/P EST LOW 20 MIN: CPT

## 2024-11-14 RX ORDER — FLUCONAZOLE 150 MG/1
150 TABLET ORAL ONCE
Qty: 2 TABLET | Refills: 0 | Status: SHIPPED | OUTPATIENT
Start: 2024-11-14 | End: 2024-11-16

## 2024-11-14 RX ORDER — CIPROFLOXACIN 500 MG/1
500 TABLET, FILM COATED ORAL 2 TIMES DAILY
Qty: 10 TABLET | Refills: 0 | Status: SHIPPED | OUTPATIENT
Start: 2024-11-14

## 2024-11-14 NOTE — ASSESSMENT & PLAN NOTE
Patient complains of bilateral flank pain, foul smelling urine, generalized weakness, aches.  Symptoms started yesterday.   She denies fever, chills, nausea, vomiting, or diarrhea.   UA- (+) leukocytes, (-) for nitrites and blood  Treatment per orders  Urine culture pending

## 2024-11-14 NOTE — PROGRESS NOTES
Mode of Visit: Video  Location of patient: home  You have chosen to receive care through a telehealth visit.  Does the patient consent to use a video/audio connection for your medical care today? Yes  The visit included audio and video interaction. No technical issues occurred during this visit.     Chief Complaint  Urinary Tract Infection (/)    Subjective          Ofelia Barrera presents to [facility]  Urinary Tract Infection       Pt complains of UTI symptoms.    Objective   Vital Signs:   There were no vitals taken for this visit.    Physical Exam   Constitutional: She appears well-developed and well-nourished.   HENT:   Head: Normocephalic.   Eyes: EOM are normal.   Neck: Neck normal appearance.  Pulmonary/Chest: Effort normal.  No respiratory distress.  Neurological: She is alert.   Skin: Skin is warm and dry.   Psychiatric: She has a normal mood and affect.     Result Review :                 Assessment and Plan    Diagnoses and all orders for this visit:    1. Acute cystitis without hematuria (Primary)  Assessment & Plan:  Patient complains of bilateral flank pain, foul smelling urine, generalized weakness, aches.  Symptoms started yesterday.   She denies fever, chills, nausea, vomiting, or diarrhea.   UA- (+) leukocytes, (-) for nitrites and blood  Treatment per orders  Urine culture pending      Other orders  -     ciprofloxacin (Cipro) 500 MG tablet; Take 1 tablet by mouth 2 (Two) Times a Day.  Dispense: 10 tablet; Refill: 0  -     fluconazole (Diflucan) 150 MG tablet; Take 1 tablet by mouth 1 (One) Time for 1 dose. May repeat in 72 hours if needed  Dispense: 2 tablet; Refill: 0        Follow Up   Return if symptoms worsen or fail to improve.  Patient was given instructions and counseling regarding her condition or for health maintenance advice. Please see specific information pulled into the AVS if appropriate.

## 2025-02-27 ENCOUNTER — OFFICE VISIT (OUTPATIENT)
Dept: OBSTETRICS AND GYNECOLOGY | Facility: CLINIC | Age: 40
End: 2025-02-27
Payer: COMMERCIAL

## 2025-02-27 VITALS
SYSTOLIC BLOOD PRESSURE: 115 MMHG | DIASTOLIC BLOOD PRESSURE: 77 MMHG | WEIGHT: 228 LBS | BODY MASS INDEX: 34.56 KG/M2 | HEART RATE: 65 BPM | HEIGHT: 68 IN

## 2025-02-27 DIAGNOSIS — Z01.419 ENCOUNTER FOR GYNECOLOGICAL EXAMINATION WITHOUT ABNORMAL FINDING: Primary | ICD-10-CM

## 2025-02-27 DIAGNOSIS — Z30.431 ENCOUNTER FOR ROUTINE CHECKING OF INTRAUTERINE CONTRACEPTIVE DEVICE (IUD): ICD-10-CM

## 2025-02-27 PROCEDURE — 87624 HPV HI-RISK TYP POOLED RSLT: CPT | Performed by: NURSE PRACTITIONER

## 2025-02-27 PROCEDURE — G0123 SCREEN CERV/VAG THIN LAYER: HCPCS | Performed by: NURSE PRACTITIONER

## 2025-02-27 NOTE — PROGRESS NOTES
Well Woman Visit    CC: Scheduled annual well gyn visit  Chief Complaint   Patient presents with    Gynecologic Exam     wwe       [facility] intake in the past?: yes  Changes in Family Hx since? NO    HPI:   History of Present Illness  The patient is a 39-year-old female who presents for her annual GYN exam.    She has been utilizing the Mirena intrauterine device for contraception, which has effectively suppressed her menstrual cycle. She does not recall the duration of Mirena use but believes it was inserted after the birth of her 12-year-old daughter. She recalls having the first Mirena for 7 years. She had one removed on 2019 and feels she had it replaced immediately. She reports no recent surgical procedures or alterations in her family medical history. She expresses no concerns regarding potential sexually transmitted infections. Her immunization status is current, including influenza and COVID-19 vaccines.    She acknowledges that she is due for a mammogram. She recalls that during her previous mammogram, an abnormality was detected on the left side near the chest wall, necessitating further evaluation with an ultrasound.    Supplemental Information  She finished the Cipro. She is taking iron and hydrochlorothiazide. She is not taking lisinopril since the insurance took it away. She is taking vitamin D.    FAMILY HISTORY  No changes in family history.    MEDICATIONS  Current: Iron, hydrochlorothiazide, Mirena, vitamin D.  Discontinued: Lisinopril.    IMMUNIZATIONS  She is up to date with her influenza and COVID-19 vaccines.    39 y.o.   Social History     Substance and Sexual Activity   Sexual Activity Yes    Partners: Male    Birth control/protection: I.U.D.       PCP: does manage PMHx and preventative labs  History: PMHx, Meds, Allergies, PSHx, Social Hx, and POBHx all reviewed and updated.    Pt has no complaints today.    Results  Laboratory Studies  Pap smear in  was negative for cytology  "and positive for HPV.    PHYSICAL EXAM:  /77   Pulse 65   Ht 172.7 cm (68\")   Wt 103 kg (228 lb)   BMI 34.67 kg/m²  Not found.     Exam conducted with a chaperone present  General- NAD, alert and oriented, appropriate  Psych- Normal mood, good memory  Neck- No masses, no thyroid enlargement  CV- Regular rhythm, no murnurs  Resp- CTA to bases, no wheezes  Abdomen- Soft, non distended, non tender, no masses    Breast left-  Bilaterally symmetrical, no masses, non tender, no nipple discharge  Breast right- Bilaterally symmetrical, no masses, non tender, no nipple discharge    External genitalia- Normal female, no lesions  Urethra/meatus- Normal, no masses, non tender  Bladder- Normal, no masses, non tender  Vagina- Normal, no atrophy, no lesions, no discharge.  Prolapse : none noted   Cvx- Normal, no lesions, no discharge, No cervical motion tenderness, IUD strings visable 2-3 cm  Uterus- Normal size, shape & consistency.  Non tender, mobile.  Adnexa- No mass, non tender  Anus/Rectum/Perineum- Not performed    Lymphatic- No palpable neck, axillary, or groin nodes  Ext- No edema, no cyanosis    Skin- No lesions, no rashes, no acanthosis nigricans      ASSESSMENT and PLAN:    Assessment & Plan  1. Annual gynecological examination.  Her last Pap smear conducted in 2022 yielded negative cytology results but was positive for HPV. She is currently using Mirena for contraception, which was inserted on 04/23/2019 and is valid until 04/23/2027. A repeat Pap smear will be performed today. She is due for a mammogram, which will be ordered. She was informed that it is less likely that she would have to undergo a diagnostic mammogram this time since they have both of those pictures to look at as long as it looks stable.    PROCEDURE  Mirena intrauterine device was inserted on 04/23/2019.    Diagnoses and all orders for this visit:    1. Encounter for gynecological examination without abnormal finding (Primary)  -     IgCORY, " Aptima HPV  -     Mammo Screening Digital Tomosynthesis Bilateral With CAD; Future    2. Encounter for routine checking of intrauterine contraceptive device (IUD)        Preventative:  BREAST HEALTH- Monthly self breast exam importance and how to reviewed. MMG and/or MRI (prn) reviewed per society guidelines and her individual history. Screen: Updated today, Pt will call to schedule  CERVICAL CANCER Screening- Reviewed current ASCCP guidelines for screening w and wo cotest HPV, age specific.  Screen: Updated today  SEXUAL HEALTH: Declines STD screening  VACCINATIONS Recommended: Covid vaccine, Flu annually.  Importance discussed, risk being unvaccinated reviewed.  Questions answered  Smoking status- NON SMOKER/VAPER        She understands the importance of having any ordered tests to be performed in a timely fashion.  The risks of not performing them include, but are not limited to, advanced cancer stages, bone loss from osteoporosis and/or subsequent increase in morbidity and/or mortality.  She is encouraged to review her results online and/or contact or office if she has questions.     Follow Up:  Return in about 1 year (around 2/27/2026) for Annual physical.    Patient or patient representative verbalized consent for the use of Ambient Listening during the visit with  SERINA Espinoza for chart documentation. 2/27/2025  15:24 EST      SERINA Espinoza  02/27/2025    [facility]  [facility]  [address] [address]  Dept: [phone]  Dept Fax: [phone]  Loc: [phone]

## 2025-03-04 DIAGNOSIS — Z01.84 IMMUNITY STATUS TESTING: Primary | ICD-10-CM

## 2025-03-05 LAB
CYTOLOGIST CVX/VAG CYTO: NORMAL
CYTOLOGY CVX/VAG DOC CYTO: NORMAL
CYTOLOGY CVX/VAG DOC THIN PREP: NORMAL
DX ICD CODE: NORMAL
HPV I/H RISK 4 DNA CVX QL PROBE+SIG AMP: NEGATIVE
Lab: NORMAL
OTHER STN SPEC: NORMAL
SERVICE CMNT-IMP: NORMAL
STAT OF ADQ CVX/VAG CYTO-IMP: NORMAL

## 2025-03-27 ENCOUNTER — TELEPHONE (OUTPATIENT)
Dept: INTERNAL MEDICINE | Age: 40
End: 2025-03-27
Payer: COMMERCIAL

## 2025-04-03 ENCOUNTER — HOSPITAL ENCOUNTER (OUTPATIENT)
Dept: MAMMOGRAPHY | Facility: HOSPITAL | Age: 40
Discharge: HOME | End: 2025-04-03
Payer: COMMERCIAL

## 2025-04-03 ENCOUNTER — HOSPITAL ENCOUNTER (OUTPATIENT)
Dept: ULTRASOUND IMAGING | Facility: HOSPITAL | Age: 40
Discharge: HOME | End: 2025-04-03
Payer: COMMERCIAL

## 2025-04-03 DIAGNOSIS — R92.8 ABNORMAL MAMMOGRAM: ICD-10-CM

## 2025-04-03 PROCEDURE — 76642 ULTRASOUND BREAST LIMITED: CPT

## 2025-04-03 PROCEDURE — G0279 TOMOSYNTHESIS, MAMMO: HCPCS

## 2025-04-03 PROCEDURE — 77066 DX MAMMO INCL CAD BI: CPT

## 2025-04-08 ENCOUNTER — OFFICE VISIT (OUTPATIENT)
Dept: INTERNAL MEDICINE | Age: 40
End: 2025-04-08
Payer: COMMERCIAL

## 2025-04-08 VITALS
HEART RATE: 63 BPM | OXYGEN SATURATION: 98 % | SYSTOLIC BLOOD PRESSURE: 110 MMHG | WEIGHT: 229.4 LBS | TEMPERATURE: 98.6 F | HEIGHT: 68 IN | RESPIRATION RATE: 16 BRPM | DIASTOLIC BLOOD PRESSURE: 70 MMHG | BODY MASS INDEX: 34.77 KG/M2

## 2025-04-08 DIAGNOSIS — I10 ESSENTIAL HYPERTENSION: ICD-10-CM

## 2025-04-08 DIAGNOSIS — R14.0 BLOATING: ICD-10-CM

## 2025-04-08 DIAGNOSIS — E66.811 OBESITY (BMI 30.0-34.9): ICD-10-CM

## 2025-04-08 DIAGNOSIS — R63.5 WEIGHT GAIN: ICD-10-CM

## 2025-04-08 DIAGNOSIS — Z79.899 MEDICATION MANAGEMENT: ICD-10-CM

## 2025-04-08 DIAGNOSIS — R53.83 OTHER FATIGUE: Primary | ICD-10-CM

## 2025-04-08 LAB
AMPHET+METHAMPHET UR QL: NEGATIVE
AMPHETAMINE INTERNAL CONTROL: NORMAL
AMPHETAMINES UR QL: NEGATIVE
BARBITURATE INTERNAL CONTROL: NORMAL
BARBITURATES UR QL SCN: NEGATIVE
BENZODIAZ UR QL SCN: NEGATIVE
BENZODIAZEPINE INTERNAL CONTROL: NORMAL
BUPRENORPHINE INTERNAL CONTROL: NORMAL
BUPRENORPHINE SERPL-MCNC: NEGATIVE NG/ML
CANNABINOIDS SERPL QL: NEGATIVE
COCAINE INTERNAL CONTROL: NORMAL
COCAINE UR QL: NEGATIVE
EXPIRATION DATE: NORMAL
Lab: NORMAL
MDMA (ECSTASY) INTERNAL CONTROL: NORMAL
MDMA UR QL SCN: NEGATIVE
METHADONE INTERNAL CONTROL: NORMAL
METHADONE UR QL SCN: NEGATIVE
METHAMPHETAMINE INTERNAL CONTROL: NORMAL
MORPHINE INTERNAL CONTROL: NORMAL
MORPHINE/OPIATES SCREEN, URINE: NEGATIVE
OXYCODONE INTERNAL CONTROL: NORMAL
OXYCODONE UR QL SCN: NEGATIVE
PCP UR QL SCN: NEGATIVE
PHENCYCLIDINE INTERNAL CONTROL: NORMAL
THC INTERNAL CONTROL: NORMAL

## 2025-04-08 RX ORDER — PHENTERMINE HYDROCHLORIDE 37.5 MG/1
37.5 CAPSULE ORAL EVERY MORNING
Qty: 30 CAPSULE | Refills: 0 | Status: SHIPPED | OUTPATIENT
Start: 2025-04-08

## 2025-04-08 NOTE — ASSESSMENT & PLAN NOTE
Patient's (Body mass index is 34.88 kg/m².) indicates that they are obese (BMI >30) with health conditions that include hypertension . Weight is unchanged. BMI  is above average; no BMI management plan is appropriate. We discussed portion control and increasing exercise    Will start patient on phentermine 37.5 mg capsule daily.  Patient has been on this medication previously and tolerated well. Patient is aware of the risks/benefits of medication.  Recommend increasing exercise and healthier diet.

## 2025-04-08 NOTE — PROGRESS NOTES
Chief Complaint  Edema (39 year old female here today for swelling/edema. Needs labs completed) and Weight Gain (Asking for something to help with weight gain. Tried Phentermine in the past and did well on this. )    History of Present Illness  SUBJECTIVE  Ofleia Barrera presents to [facility]     History of Present Illness  The patient presents for evaluation of lower extremity swelling, hand swelling, fatigue, weight gain, and bloating.    She has been experiencing edema in her lower extremities and hands, accompanied by a sensation of puffiness. Additionally, she reports significant fatigue and a lack of energy over the past few months. She also mentions persistent bloating. She is considering resuming phentermine treatment, which she has previously found effective in managing her symptoms. She typically responds well to a starting dose of 37.5 mg. She plans to supplement this medication with dietary modifications and increased physical activity, hoping that the medication will provide an energy boost to facilitate these changes.          Past Medical History:   Diagnosis Date    Back pain     Calculus of kidney     Essential hypertension     Lumbar disc displacement without myelopathy 11/19/2013    Sciatica 11/19/2013    recurrent symptoms in the left S1 dist.    Weight gain       Family History   Problem Relation Age of Onset    Hypertension Father     Breast cancer Neg Hx     Ovarian cancer Neg Hx     Uterine cancer Neg Hx     Colon cancer Neg Hx     Melanoma Neg Hx     Prostate cancer Neg Hx     Diabetes Neg Hx     Stroke Neg Hx       Past Surgical History:   Procedure Laterality Date    KIDNEY STONE SURGERY      lithotripsy    OTHER SURGICAL HISTORY  10/28/2013    discectomy        Current Outpatient Medications:     hydroCHLOROthiazide 25 MG tablet, Take 1 tablet by mouth Daily., Disp: 90 tablet, Rfl: 1    levonorgestrel (MIRENA) 20 MCG/24HR IUD, 1 each by Intrauterine  "route 1 (One) Time., Disp: , Rfl:     lisinopril (PRINIVIL,ZESTRIL) 10 MG tablet, Take 1 tablet by mouth Daily., Disp: 90 tablet, Rfl: 1    vitamin D3 125 MCG (5000 UT) capsule capsule, Take one capsule by mouth daily., Disp: 30 capsule, Rfl: 0    ferrous sulfate 325 (65 Fe) MG tablet, Take one tablet by mouth daily. (Patient not taking: Reported on 4/8/2025), Disp: 30 tablet, Rfl: 0    phentermine 37.5 MG capsule, Take 1 capsule by mouth Every Morning., Disp: 30 capsule, Rfl: 0    OBJECTIVE  Vital Signs:   /70 (BP Location: Left arm, Patient Position: Sitting, Cuff Size: Large Adult)   Pulse 63   Temp 98.6 °F (37 °C) (Temporal)   Resp 16   Ht 172.7 cm (68\")   Wt 104 kg (229 lb 6.4 oz)   SpO2 98%   BMI 34.88 kg/m²    Estimated body mass index is 34.88 kg/m² as calculated from the following:    Height as of this encounter: 172.7 cm (68\").    Weight as of this encounter: 104 kg (229 lb 6.4 oz).     Wt Readings from Last 3 Encounters:   04/08/25 104 kg (229 lb 6.4 oz)   02/27/25 103 kg (228 lb)   08/05/24 94.3 kg (207 lb 12.8 oz)     BP Readings from Last 3 Encounters:   04/08/25 110/70   02/27/25 115/77   08/05/24 115/75       Physical Exam  Vitals and nursing note reviewed.   Constitutional:       Appearance: Normal appearance.   HENT:      Head: Normocephalic and atraumatic.   Eyes:      Extraocular Movements: Extraocular movements intact.      Conjunctiva/sclera: Conjunctivae normal.   Cardiovascular:      Rate and Rhythm: Normal rate and regular rhythm.      Heart sounds: Normal heart sounds.   Pulmonary:      Effort: Pulmonary effort is normal.      Breath sounds: Normal breath sounds.   Abdominal:      General: Abdomen is flat. Bowel sounds are normal.      Palpations: Abdomen is soft.   Musculoskeletal:         General: Normal range of motion.      Right hand: Swelling present.      Left hand: Swelling present.      Cervical back: Normal range of motion and neck supple.      Right lower leg: Edema " present.      Left lower leg: Edema present.   Skin:     General: Skin is warm and dry.   Neurological:      General: No focal deficit present.      Mental Status: She is alert and oriented to person, place, and time. Mental status is at baseline.   Psychiatric:         Mood and Affect: Mood normal.         Behavior: Behavior normal.         Thought Content: Thought content normal.         Judgment: Judgment normal.          Result Review        Mammo Diagnostic Digital Tomosynthesis Bilateral With CAD  Result Date: 4/3/2025  Right breast: No mammographic evidence of malignancy. Recommend routine right breast screening..  Left breast: The focal asymmetry in the central slightly upper and outer left breast demonstrates a 20-month stability pattern mammographically and there are no suspicious sonographic abnormalities. Findings are considered benign. The patient can return to routine left breast screening.  Tissue Density:  The breasts are heterogenously dense, which may obscure small masses.  BI-RADS ASSESSMENT: BI-RADS 2. Benign findings.   The patient's information is entered into a computerized reminder system with a targeted due date for the next mammogram.  Note:  It has been reported that there is approximately a 15% false negative in mammography.  Therefore, management of a palpable abnormality should not be deferred because of a negative mammogram.            4/3/2025 1:20 PM by CHRIST GANDHI MD on Workstation: [id number]      US Breast Left Limited  Result Date: 4/3/2025  Right breast: No mammographic evidence of malignancy. Recommend routine right breast screening..  Left breast: The focal asymmetry in the central slightly upper and outer left breast demonstrates a 20-month stability pattern mammographically and there are no suspicious sonographic abnormalities. Findings are considered benign. The patient can return to routine left breast screening.  Tissue Density:  The breasts are heterogenously dense,  which may obscure small masses.  BI-RADS ASSESSMENT: BI-RADS 2. Benign findings.   The patient's information is entered into a computerized reminder system with a targeted due date for the next mammogram.  Note:  It has been reported that there is approximately a 15% false negative in mammography.  Therefore, management of a palpable abnormality should not be deferred because of a negative mammogram.            4/3/2025 1:20 PM by CHRIST GANDHI MD on Workstation: [email or web address]         The above data has been reviewed by SERINA Yates 04/08/2025 13:56 EDT.          Patient Care Team:  Danyell Sandy APRN as PCP - General (Internal Medicine)  Leora Montes DO as Consulting Physician (Endocrinology)            ASSESSMENT & PLAN    Diagnoses and all orders for this visit:    1. Other fatigue (Primary)  Assessment & Plan:  Lab orders placed.      Orders:  -     Thyroid Peroxidase Antibody  -     OLI by IFA, Reflex 9-biomarkers profile; Future  -     Sedimentation Rate; Future  -     C-reactive protein; Future  -     Uric acid; Future  -     Estrogens, Total; Future  -     Progesterone; Future  -     Testosterone, Total, Women, Children, and Hypogonadal Males; Future  -     Cortisol; Future    2. Obesity (BMI 30.0-34.9)  Assessment & Plan:  Patient's (Body mass index is 34.88 kg/m².) indicates that they are obese (BMI >30) with health conditions that include hypertension . Weight is unchanged. BMI  is above average; no BMI management plan is appropriate. We discussed portion control and increasing exercise    Will start patient on phentermine 37.5 mg capsule daily.  Patient has been on this medication previously and tolerated well. Patient is aware of the risks/benefits of medication.  Recommend increasing exercise and healthier diet.    Orders:  -     Thyroid Peroxidase Antibody  -     phentermine 37.5 MG capsule; Take 1 capsule by mouth Every Morning.  Dispense: 30 capsule; Refill: 0  -     Estrogens, Total;  Future  -     Progesterone; Future  -     Testosterone, Total, Women, Children, and Hypogonadal Males; Future  -     Cortisol; Future    3. Weight gain  -     Thyroid Peroxidase Antibody  -     Celiac Disease Panel; Future  -     OLI by IFA, Reflex 9-biomarkers profile; Future  -     Sedimentation Rate; Future  -     C-reactive protein; Future  -     Uric acid; Future  -     phentermine 37.5 MG capsule; Take 1 capsule by mouth Every Morning.  Dispense: 30 capsule; Refill: 0    4. Bloating  -     Celiac Disease Panel; Future    5. Medication management  -     POC 12 Panel Urine Drug Screen    6. Essential hypertension  Assessment & Plan:  Blood pressure is well controlled  She remains on hydrochlorothiazide and potassium  Continue current regiemn.            Assessment & Plan  1. Edema, fatigue, and bloating.  She has been experiencing lower extremity swelling, hand swelling, fatigue, and bloating for the last couple of months. She will be initiated on a regimen of phentermine 37.5 mg, a controlled substance, to aid in weight loss and potentially improve her energy levels. The prescription will be sent to the hospital pharmacy. A urine drug screen will be conducted today, and she will be required to sign a controlled substance agreement. Routine laboratory tests, including thyroid antibodies, inflammatory markers, CRP, uric acid, and sed rate, will be ordered. Given her history of positive OLI, a reflex test will be performed for further specificity. Additionally, a celiac panel will be conducted to assess for potential gluten sensitivity. An iron profile and ferritin level test will also be ordered to rule out anemia as a cause of her fatigue. She is advised to follow up with Danyell on a monthly basis to monitor her progress on the medication.      Tobacco Use: Low Risk  (4/8/2025)    Patient History     Smoking Tobacco Use: Never     Smokeless Tobacco Use: Never     Passive Exposure: Not on file       Follow Up      Return in about 4 weeks (around 5/6/2025) for Recheck.    Please note that portions of this note were completed with a voice recognition program.    Patient was given instructions and counseling regarding her condition or for health maintenance advice. Please see specific information pulled into the AVS if appropriate.   I have reviewed information obtained and documented by others and I have confirmed the accuracy of this documented note.    SERINA Yates    Patient or patient representative verbalized consent for the use of Ambient Listening during the visit with  SERINA Yates for chart documentation. 4/8/2025  14:28 EDT

## 2025-04-09 ENCOUNTER — LAB (OUTPATIENT)
Facility: HOSPITAL | Age: 40
End: 2025-04-09
Payer: COMMERCIAL

## 2025-04-09 DIAGNOSIS — R14.0 BLOATING: ICD-10-CM

## 2025-04-09 DIAGNOSIS — Z01.84 IMMUNITY STATUS TESTING: ICD-10-CM

## 2025-04-09 DIAGNOSIS — R63.5 WEIGHT GAIN: ICD-10-CM

## 2025-04-09 DIAGNOSIS — E55.9 VITAMIN D DEFICIENCY: ICD-10-CM

## 2025-04-09 DIAGNOSIS — Z00.00 ANNUAL PHYSICAL EXAM: ICD-10-CM

## 2025-04-09 DIAGNOSIS — R53.83 OTHER FATIGUE: ICD-10-CM

## 2025-04-09 DIAGNOSIS — E66.811 OBESITY (BMI 30.0-34.9): ICD-10-CM

## 2025-04-09 DIAGNOSIS — E61.1 IRON DEFICIENCY: ICD-10-CM

## 2025-04-09 LAB
25(OH)D3 SERPL-MCNC: 18.5 NG/ML (ref 30–100)
ALBUMIN SERPL-MCNC: 4.1 G/DL (ref 3.5–5.2)
ALBUMIN/GLOB SERPL: 1.2 G/DL
ALP SERPL-CCNC: 121 U/L (ref 39–117)
ALT SERPL W P-5'-P-CCNC: 35 U/L (ref 1–33)
ANION GAP SERPL CALCULATED.3IONS-SCNC: 9.8 MMOL/L (ref 5–15)
AST SERPL-CCNC: 30 U/L (ref 1–32)
BASOPHILS # BLD AUTO: 0.06 10*3/MM3 (ref 0–0.2)
BASOPHILS NFR BLD AUTO: 0.9 % (ref 0–1.5)
BILIRUB SERPL-MCNC: 0.5 MG/DL (ref 0–1.2)
BUN SERPL-MCNC: 12 MG/DL (ref 6–20)
BUN/CREAT SERPL: 15.8 (ref 7–25)
CALCIUM SPEC-SCNC: 7.6 MG/DL (ref 8.6–10.5)
CHLORIDE SERPL-SCNC: 100 MMOL/L (ref 98–107)
CHOLEST SERPL-MCNC: 203 MG/DL (ref 0–200)
CO2 SERPL-SCNC: 26.2 MMOL/L (ref 22–29)
CORTIS SERPL-MCNC: 10.3 MCG/DL
CREAT SERPL-MCNC: 0.76 MG/DL (ref 0.57–1)
CRP SERPL-MCNC: 0.52 MG/DL (ref 0–0.5)
DEPRECATED RDW RBC AUTO: 38.8 FL (ref 37–54)
EGFRCR SERPLBLD CKD-EPI 2021: 101.1 ML/MIN/1.73
EOSINOPHIL # BLD AUTO: 0.49 10*3/MM3 (ref 0–0.4)
EOSINOPHIL NFR BLD AUTO: 7.1 % (ref 0.3–6.2)
ERYTHROCYTE [DISTWIDTH] IN BLOOD BY AUTOMATED COUNT: 13 % (ref 12.3–15.4)
ERYTHROCYTE [SEDIMENTATION RATE] IN BLOOD: 19 MM/HR (ref 0–20)
FERRITIN SERPL-MCNC: 27.1 NG/ML (ref 13–150)
FOLATE SERPL-MCNC: 5.06 NG/ML (ref 4.78–24.2)
GLOBULIN UR ELPH-MCNC: 3.4 GM/DL
GLUCOSE SERPL-MCNC: 90 MG/DL (ref 65–99)
HCT VFR BLD AUTO: 36.7 % (ref 34–46.6)
HDLC SERPL-MCNC: 33 MG/DL (ref 40–60)
HGB BLD-MCNC: 12.3 G/DL (ref 12–15.9)
IMM GRANULOCYTES # BLD AUTO: 0.01 10*3/MM3 (ref 0–0.05)
IMM GRANULOCYTES NFR BLD AUTO: 0.1 % (ref 0–0.5)
IRON 24H UR-MRATE: 121 MCG/DL (ref 37–145)
IRON SATN MFR SERPL: 26 % (ref 20–50)
LDLC SERPL CALC-MCNC: 158 MG/DL (ref 0–100)
LDLC/HDLC SERPL: 4.75 {RATIO}
LYMPHOCYTES # BLD AUTO: 2.03 10*3/MM3 (ref 0.7–3.1)
LYMPHOCYTES NFR BLD AUTO: 29.4 % (ref 19.6–45.3)
MCH RBC QN AUTO: 27.3 PG (ref 26.6–33)
MCHC RBC AUTO-ENTMCNC: 33.5 G/DL (ref 31.5–35.7)
MCV RBC AUTO: 81.6 FL (ref 79–97)
MONOCYTES # BLD AUTO: 0.37 10*3/MM3 (ref 0.1–0.9)
MONOCYTES NFR BLD AUTO: 5.4 % (ref 5–12)
NEUTROPHILS NFR BLD AUTO: 3.94 10*3/MM3 (ref 1.7–7)
NEUTROPHILS NFR BLD AUTO: 57.1 % (ref 42.7–76)
NRBC BLD AUTO-RTO: 0 /100 WBC (ref 0–0.2)
PLATELET # BLD AUTO: 442 10*3/MM3 (ref 140–450)
PMV BLD AUTO: 10.4 FL (ref 6–12)
POTASSIUM SERPL-SCNC: 3.1 MMOL/L (ref 3.5–5.2)
PROGEST SERPL-MCNC: 0.35 NG/ML
PROT SERPL-MCNC: 7.5 G/DL (ref 6–8.5)
RBC # BLD AUTO: 4.5 10*6/MM3 (ref 3.77–5.28)
SODIUM SERPL-SCNC: 136 MMOL/L (ref 136–145)
TIBC SERPL-MCNC: 468 MCG/DL (ref 298–536)
TRANSFERRIN SERPL-MCNC: 314 MG/DL (ref 200–360)
TRIGL SERPL-MCNC: 66 MG/DL (ref 0–150)
TSH SERPL DL<=0.05 MIU/L-ACNC: 1.96 UIU/ML (ref 0.27–4.2)
URATE SERPL-MCNC: 5.8 MG/DL (ref 2.4–5.7)
VIT B12 BLD-MCNC: 181 PG/ML (ref 211–946)
VLDLC SERPL-MCNC: 12 MG/DL (ref 5–40)
WBC NRBC COR # BLD AUTO: 6.9 10*3/MM3 (ref 3.4–10.8)

## 2025-04-09 PROCEDURE — 86376 MICROSOMAL ANTIBODY EACH: CPT

## 2025-04-09 PROCEDURE — 84403 ASSAY OF TOTAL TESTOSTERONE: CPT

## 2025-04-09 PROCEDURE — 86765 RUBEOLA ANTIBODY: CPT

## 2025-04-09 PROCEDURE — 84466 ASSAY OF TRANSFERRIN: CPT

## 2025-04-09 PROCEDURE — 80061 LIPID PANEL: CPT

## 2025-04-09 PROCEDURE — 83540 ASSAY OF IRON: CPT

## 2025-04-09 PROCEDURE — 86762 RUBELLA ANTIBODY: CPT

## 2025-04-09 PROCEDURE — 82533 TOTAL CORTISOL: CPT

## 2025-04-09 PROCEDURE — 86231 EMA EACH IG CLASS: CPT

## 2025-04-09 PROCEDURE — 36415 COLL VENOUS BLD VENIPUNCTURE: CPT

## 2025-04-09 PROCEDURE — 82728 ASSAY OF FERRITIN: CPT

## 2025-04-09 PROCEDURE — 82784 ASSAY IGA/IGD/IGG/IGM EACH: CPT

## 2025-04-09 PROCEDURE — 82672 ASSAY OF ESTROGEN: CPT

## 2025-04-09 PROCEDURE — 85652 RBC SED RATE AUTOMATED: CPT

## 2025-04-09 PROCEDURE — 82746 ASSAY OF FOLIC ACID SERUM: CPT

## 2025-04-09 PROCEDURE — 86364 TISS TRNSGLTMNASE EA IG CLAS: CPT

## 2025-04-09 PROCEDURE — 82306 VITAMIN D 25 HYDROXY: CPT

## 2025-04-09 PROCEDURE — 80050 GENERAL HEALTH PANEL: CPT

## 2025-04-09 PROCEDURE — 82607 VITAMIN B-12: CPT

## 2025-04-09 PROCEDURE — 84144 ASSAY OF PROGESTERONE: CPT

## 2025-04-09 PROCEDURE — 84550 ASSAY OF BLOOD/URIC ACID: CPT

## 2025-04-09 PROCEDURE — 86140 C-REACTIVE PROTEIN: CPT

## 2025-04-09 PROCEDURE — 86038 ANTINUCLEAR ANTIBODIES: CPT

## 2025-04-09 PROCEDURE — 86735 MUMPS ANTIBODY: CPT

## 2025-04-10 ENCOUNTER — RESULTS FOLLOW-UP (OUTPATIENT)
Facility: HOSPITAL | Age: 40
End: 2025-04-10
Payer: COMMERCIAL

## 2025-04-10 DIAGNOSIS — E83.51 HYPOCALCEMIA: ICD-10-CM

## 2025-04-10 DIAGNOSIS — M25.50 ARTHRALGIA, UNSPECIFIED JOINT: ICD-10-CM

## 2025-04-10 DIAGNOSIS — E87.6 HYPOKALEMIA: Primary | ICD-10-CM

## 2025-04-10 DIAGNOSIS — E55.9 VITAMIN D DEFICIENCY: ICD-10-CM

## 2025-04-10 DIAGNOSIS — R14.0 ABDOMINAL BLOATING: ICD-10-CM

## 2025-04-10 DIAGNOSIS — K90.9 STEATORRHEA: ICD-10-CM

## 2025-04-10 DIAGNOSIS — R53.83 OTHER FATIGUE: ICD-10-CM

## 2025-04-10 LAB
MEV IGG SER IA-ACNC: 164 AU/ML
MUV IGG SER IA-ACNC: 81.2 AU/ML
RUBV IGG SERPL IA-ACNC: <0.9 INDEX
THYROPEROXIDASE AB SERPL-ACNC: 12 IU/ML (ref 0–34)

## 2025-04-10 RX ORDER — ERGOCALCIFEROL 1.25 MG/1
50000 CAPSULE, LIQUID FILLED ORAL WEEKLY
Qty: 12 CAPSULE | Refills: 1 | Status: SHIPPED | OUTPATIENT
Start: 2025-04-10 | End: 2025-07-04

## 2025-04-11 LAB
ENDOMYSIUM IGA SER QL: POSITIVE
ESTROGEN SERPL-MCNC: 243 PG/ML
IGA SERPL-MCNC: 296 MG/DL (ref 87–352)
TTG IGA SER-ACNC: 54 U/ML (ref 0–3)

## 2025-04-11 RX ORDER — CYANOCOBALAMIN 1000 UG/ML
INJECTION, SOLUTION INTRAMUSCULAR; SUBCUTANEOUS
Qty: 4 ML | Refills: 3 | Status: SHIPPED | OUTPATIENT
Start: 2025-04-11 | End: 2026-05-09

## 2025-04-14 LAB
ANA SER QL IF: POSITIVE
ANA SPECKLED TITR SER: ABNORMAL {TITER}
CENTROMERE B AB SER-ACNC: <0.2 AI (ref 0–0.9)
CHROMATIN AB SERPL-ACNC: 2.1 AI (ref 0–0.9)
DSDNA AB SER-ACNC: 1 IU/ML (ref 0–9)
ENA JO1 AB SER-ACNC: <0.2 AI (ref 0–0.9)
ENA RNP AB SER-ACNC: 0.4 AI (ref 0–0.9)
ENA SCL70 AB SER-ACNC: <0.2 AI (ref 0–0.9)
ENA SM AB SER-ACNC: <0.2 AI (ref 0–0.9)
ENA SS-A AB SER-ACNC: 0.4 AI (ref 0–0.9)
ENA SS-B AB SER-ACNC: <0.2 AI (ref 0–0.9)
LABORATORY COMMENT REPORT: ABNORMAL
Lab: ABNORMAL
Lab: ABNORMAL

## 2025-04-18 LAB — TESTOST SERPL-MCNC: 7.7 NG/DL

## 2025-05-06 ENCOUNTER — OFFICE VISIT (OUTPATIENT)
Dept: INTERNAL MEDICINE | Age: 40
End: 2025-05-06
Payer: COMMERCIAL

## 2025-05-06 VITALS
OXYGEN SATURATION: 99 % | HEIGHT: 68 IN | TEMPERATURE: 98.9 F | DIASTOLIC BLOOD PRESSURE: 77 MMHG | BODY MASS INDEX: 33.34 KG/M2 | SYSTOLIC BLOOD PRESSURE: 112 MMHG | WEIGHT: 220 LBS | RESPIRATION RATE: 18 BRPM | HEART RATE: 68 BPM

## 2025-05-06 DIAGNOSIS — E55.9 VITAMIN D DEFICIENCY: ICD-10-CM

## 2025-05-06 DIAGNOSIS — E66.811 OBESITY (BMI 30.0-34.9): Primary | ICD-10-CM

## 2025-05-06 DIAGNOSIS — I10 ESSENTIAL HYPERTENSION: ICD-10-CM

## 2025-05-06 DIAGNOSIS — E53.8 VITAMIN B 12 DEFICIENCY: ICD-10-CM

## 2025-05-06 PROCEDURE — 99214 OFFICE O/P EST MOD 30 MIN: CPT

## 2025-05-06 RX ORDER — PHENTERMINE HYDROCHLORIDE 37.5 MG/1
37.5 CAPSULE ORAL EVERY MORNING
Qty: 30 CAPSULE | Refills: 0 | Status: SHIPPED | OUTPATIENT
Start: 2025-05-06

## 2025-05-06 RX ORDER — ERGOCALCIFEROL 1.25 MG/1
50000 CAPSULE, LIQUID FILLED ORAL WEEKLY
Start: 2025-05-06

## 2025-05-06 RX ORDER — CYANOCOBALAMIN 1000 UG/ML
1000 INJECTION, SOLUTION INTRAMUSCULAR; SUBCUTANEOUS
Qty: 6 ML | Refills: 1 | Status: SHIPPED | OUTPATIENT
Start: 2025-05-06 | End: 2025-07-29

## 2025-05-06 NOTE — ASSESSMENT & PLAN NOTE
Patient's (Body mass index is 33.45 kg/m².) indicates that they are obese (BMI >30) with health conditions that include hypertension . Weight is improving with treatment. BMI  is above average; BMI management plan is completed. We discussed portion control and increasing exercise. Patient is doing well with phentermine 37.5 mg daily.  Continue current regimen.

## 2025-05-06 NOTE — PROGRESS NOTES
Chief Complaint  4 week cheeck up (- check up for phentermine )    History of Present Illness  SUBJECTIVE  Ofelia Barrera presents to [facility]     History of Present Illness  The patient presents for evaluation of fatigue, weight loss, and celiac disease.    She has been on a regimen of B12 for the past 4 weeks but reports no improvement in her energy levels. She has been taking vitamin D once a week for 12 weeks. Additionally, she has experienced a weight loss of 9 pounds, with her current weight being 220 pounds. Despite maintaining an exercise routine, she occasionally feels too fatigued to participate.    Adhering to a gluten-free diet, she believes this has contributed to her weight loss. She is feeling better. Her diet primarily consists of meat, even though she is not particularly fond of it. She has incorporated [employer] protein shakes into her meals, either for breakfast or lunch, which she finds beneficial. She has observed an improvement in her stomach condition since starting the gluten-free diet.    She has a scheduled appointment with a rheumatologist on 09/15/2025.      Past Medical History:   Diagnosis Date    Back pain     Calculus of kidney     Essential hypertension     History of medical problems     Psoriasis    Lumbar disc displacement without myelopathy 2013    Sciatica 2013    recurrent symptoms in the left S1 dist.    Thyroid nodule     Weight gain       Family History   Problem Relation Age of Onset    Hypertension Father     Hyperlipidemia Mother     Cancer Maternal Grandfather     Cancer Maternal Grandmother     Breast cancer Neg Hx     Ovarian cancer Neg Hx     Uterine cancer Neg Hx     Colon cancer Neg Hx     Melanoma Neg Hx     Prostate cancer Neg Hx     Diabetes Neg Hx     Stroke Neg Hx       Past Surgical History:   Procedure Laterality Date     SECTION  2/15/13    KIDNEY STONE SURGERY      lithotripsy    OTHER  "SURGICAL HISTORY  10/28/2013    discectomy    SPINE SURGERY  Mar. & Oct. 2013        Current Outpatient Medications:     cyanocobalamin 1000 MCG/ML injection, Inject 1 mL into the appropriate muscle as directed by prescriber Every 14 (Fourteen) Days for 28 days., Disp: 6 mL, Rfl: 1    hydroCHLOROthiazide 25 MG tablet, Take 1 tablet by mouth Daily., Disp: 90 tablet, Rfl: 1    levonorgestrel (MIRENA) 20 MCG/24HR IUD, 1 each by Intrauterine route 1 (One) Time., Disp: , Rfl:     lisinopril (PRINIVIL,ZESTRIL) 10 MG tablet, Take 1 tablet by mouth Daily., Disp: 90 tablet, Rfl: 1    phentermine 37.5 MG capsule, Take 1 capsule by mouth Every Morning., Disp: 30 capsule, Rfl: 0    vitamin D (ERGOCALCIFEROL) 1.25 MG (19345 UT) capsule capsule, Take 1 capsule by mouth 1 (One) Time Per Week., Disp: , Rfl:     ferrous sulfate 325 (65 Fe) MG tablet, Take one tablet by mouth daily. (Patient not taking: Reported on 5/6/2025), Disp: 30 tablet, Rfl: 0    OBJECTIVE  Vital Signs:   /77   Pulse 68   Temp 98.9 °F (37.2 °C) (Temporal)   Resp 18   Ht 172.7 cm (68\")   Wt 99.8 kg (220 lb)   SpO2 99%   BMI 33.45 kg/m²    Estimated body mass index is 33.45 kg/m² as calculated from the following:    Height as of this encounter: 172.7 cm (68\").    Weight as of this encounter: 99.8 kg (220 lb).     Wt Readings from Last 3 Encounters:   05/06/25 99.8 kg (220 lb)   04/08/25 104 kg (229 lb 6.4 oz)   02/27/25 103 kg (228 lb)     BP Readings from Last 3 Encounters:   05/06/25 112/77   04/08/25 110/70   02/27/25 115/77       Physical Exam  Vitals and nursing note reviewed.   Constitutional:       Appearance: Normal appearance.   HENT:      Head: Normocephalic.   Eyes:      Extraocular Movements: Extraocular movements intact.      Conjunctiva/sclera: Conjunctivae normal.   Cardiovascular:      Rate and Rhythm: Normal rate and regular rhythm.      Heart sounds: Normal heart sounds. No murmur heard.  Pulmonary:      Effort: Pulmonary effort is " normal.      Breath sounds: Normal breath sounds. No wheezing or rales.   Abdominal:      General: Bowel sounds are normal.      Palpations: Abdomen is soft.      Tenderness: There is no abdominal tenderness. There is no guarding.   Musculoskeletal:         General: No swelling. Normal range of motion.      Cervical back: Normal range of motion and neck supple.   Skin:     General: Skin is warm and dry.   Neurological:      General: No focal deficit present.      Mental Status: She is alert and oriented to person, place, and time. Mental status is at baseline.   Psychiatric:         Mood and Affect: Mood normal.         Behavior: Behavior normal.         Thought Content: Thought content normal.         Judgment: Judgment normal.          Result Review        Mammo Diagnostic Digital Tomosynthesis Bilateral With CAD  Result Date: 4/3/2025  Right breast: No mammographic evidence of malignancy. Recommend routine right breast screening..  Left breast: The focal asymmetry in the central slightly upper and outer left breast demonstrates a 20-month stability pattern mammographically and there are no suspicious sonographic abnormalities. Findings are considered benign. The patient can return to routine left breast screening.  Tissue Density:  The breasts are heterogenously dense, which may obscure small masses.  BI-RADS ASSESSMENT: BI-RADS 2. Benign findings.   The patient's information is entered into a computerized reminder system with a targeted due date for the next mammogram.  Note:  It has been reported that there is approximately a 15% false negative in mammography.  Therefore, management of a palpable abnormality should not be deferred because of a negative mammogram.            4/3/2025 1:20 PM by CHRIST GANDHI MD on Workstation: [id number]      US Breast Left Limited  Result Date: 4/3/2025  Right breast: No mammographic evidence of malignancy. Recommend routine right breast screening..  Left breast: The focal  asymmetry in the central slightly upper and outer left breast demonstrates a 20-month stability pattern mammographically and there are no suspicious sonographic abnormalities. Findings are considered benign. The patient can return to routine left breast screening.  Tissue Density:  The breasts are heterogenously dense, which may obscure small masses.  BI-RADS ASSESSMENT: BI-RADS 2. Benign findings.   The patient's information is entered into a computerized reminder system with a targeted due date for the next mammogram.  Note:  It has been reported that there is approximately a 15% false negative in mammography.  Therefore, management of a palpable abnormality should not be deferred because of a negative mammogram.            4/3/2025 1:20 PM by CHRIST GANDHI MD on Workstation: [email or web address]         The above data has been reviewed by SERINA Yates 05/06/2025 10:24 EDT.          Patient Care Team:  Danyell Sandy APRN as PCP - General (Internal Medicine)  Leora Montes DO as Consulting Physician (Endocrinology)            ASSESSMENT & PLAN    Diagnoses and all orders for this visit:    1. Obesity (BMI 30.0-34.9) (Primary)  Assessment & Plan:  Patient's (Body mass index is 33.45 kg/m².) indicates that they are obese (BMI >30) with health conditions that include hypertension . Weight is improving with treatment. BMI  is above average; BMI management plan is completed. We discussed portion control and increasing exercise. Patient is doing well with phentermine 37.5 mg daily.  Continue current regimen.     Orders:  -     phentermine 37.5 MG capsule; Take 1 capsule by mouth Every Morning.  Dispense: 30 capsule; Refill: 0    2. Essential hypertension  Assessment & Plan:  Blood pressure is well controlled   Continue current medication regimen.      3. Vitamin B 12 deficiency  -     Vitamin B12; Future  -     Folate; Future    4. Vitamin D deficiency  -     vitamin D (ERGOCALCIFEROL) 1.25 MG (21456 UT) capsule  capsule; Take 1 capsule by mouth 1 (One) Time Per Week.    Other orders  -     cyanocobalamin 1000 MCG/ML injection; Inject 1 mL into the appropriate muscle as directed by prescriber Every 14 (Fourteen) Days for 28 days.  Dispense: 6 mL; Refill: 1         Assessment & Plan  1. Fatigue.  - Her fatigue may be attributed to an autoimmune condition.  - She will transition to a biweekly regimen of B12 injections, specifically 1000 intramuscular units every 2 weeks for the next couple of months.  - She is advised to start taking either a B complex or a methylated folate supplement due to slightly low folate levels.  - Labs will be deferred for a period of 3 months.    2. Weight loss.  - She has lost 9 pounds, now weighing 220 pounds. She is tolerating medication well.  - Phentermine prescription has been refilled.  - Prescription Drug Monitoring Program was reviewed.  - Follow-up in 1 month.    3. Celiac disease.  - She is advised to continue her gluten-free diet as it has improved her stomach symptoms.  - She is encouraged to maintain her protein intake with [employer] protein shakes.  - Follow-up in 1 month.      Tobacco Use: Low Risk  (5/6/2025)    Patient History     Smoking Tobacco Use: Never     Smokeless Tobacco Use: Never     Passive Exposure: Not on file       Follow Up     Return in about 1 month (around 6/6/2025).    Please note that portions of this note were completed with a voice recognition program.    Patient was given instructions and counseling regarding her condition or for health maintenance advice. Please see specific information pulled into the AVS if appropriate.   I have reviewed information obtained and documented by others and I have confirmed the accuracy of this documented note.    SERINA Yates    Patient or patient representative verbalized consent for the use of Ambient Listening during the visit with  SERINA Yates for chart documentation. 5/6/2025  10:38 EDT

## 2025-06-09 ENCOUNTER — TELEMEDICINE (OUTPATIENT)
Dept: INTERNAL MEDICINE | Age: 40
End: 2025-06-09
Payer: COMMERCIAL

## 2025-06-09 VITALS — BODY MASS INDEX: 32.69 KG/M2 | WEIGHT: 215 LBS

## 2025-06-09 DIAGNOSIS — E66.811 OBESITY (BMI 30.0-34.9): ICD-10-CM

## 2025-06-09 PROCEDURE — 99213 OFFICE O/P EST LOW 20 MIN: CPT

## 2025-06-09 RX ORDER — PHENTERMINE HYDROCHLORIDE 37.5 MG/1
37.5 CAPSULE ORAL EVERY MORNING
Qty: 30 CAPSULE | Refills: 0 | Status: SHIPPED | OUTPATIENT
Start: 2025-06-09

## 2025-06-09 NOTE — ASSESSMENT & PLAN NOTE
Patient's (Body mass index is 32.69 kg/m².) indicates that they are obese (BMI >30) with health conditions that include hypertension . Weight is unchanged. BMI  is above average; BMI management plan is completed. We discussed portion control and increasing exercise.   She has lost 5 lbs the last month, approx 14 lbs total  Tolerating phentermine well without side effects  Continue with phentermine  Exercise changes recommended.

## 2025-06-23 DIAGNOSIS — I10 ESSENTIAL HYPERTENSION: ICD-10-CM

## 2025-06-24 RX ORDER — HYDROCHLOROTHIAZIDE 25 MG/1
25 TABLET ORAL DAILY
Qty: 90 TABLET | Refills: 1 | Status: SHIPPED | OUTPATIENT
Start: 2025-06-24

## 2025-06-24 RX ORDER — LISINOPRIL 10 MG/1
10 TABLET ORAL DAILY
Qty: 90 TABLET | Refills: 1 | Status: SHIPPED | OUTPATIENT
Start: 2025-06-24

## 2025-07-17 ENCOUNTER — TELEMEDICINE (OUTPATIENT)
Dept: INTERNAL MEDICINE | Age: 40
End: 2025-07-17
Payer: COMMERCIAL

## 2025-07-17 DIAGNOSIS — E66.811 OBESITY (BMI 30.0-34.9): Primary | ICD-10-CM

## 2025-07-17 DIAGNOSIS — L70.9 ACNE, UNSPECIFIED ACNE TYPE: ICD-10-CM

## 2025-07-17 PROCEDURE — 99213 OFFICE O/P EST LOW 20 MIN: CPT

## 2025-07-17 RX ORDER — TRETINOIN 1 MG/G
1 CREAM TOPICAL NIGHTLY
Qty: 45 G | Refills: 2 | Status: SHIPPED | OUTPATIENT
Start: 2025-07-17

## 2025-07-17 RX ORDER — PHENTERMINE HYDROCHLORIDE 37.5 MG/1
37.5 CAPSULE ORAL EVERY MORNING
Qty: 30 CAPSULE | Refills: 0 | Status: SHIPPED | OUTPATIENT
Start: 2025-07-17

## 2025-07-17 NOTE — PROGRESS NOTES
Mode of Visit: Video  Location of patient: home  You have chosen to receive care through a telehealth visit.  The patient has signed the video visit consent form.  The visit included audio and video interaction. No technical issues occurred during this visit.     Chief Complaint  F/U on weight loss    Subjective          Ofelia Barrera presents to [facility]  History of Present Illness  Follow up on weight.  Objective   Vital Signs:   There were no vitals taken for this visit.    Virtual Visit Physical Exam  Result Review :                 Assessment and Plan    Diagnoses and all orders for this visit:    1. Obesity (BMI 30.0-34.9) (Primary)  Assessment & Plan:  Patient's weight is at 211. She has lost about 4 lbs  She is doing well on phentermine.  She denies any side effects  Will continue with phentermine.    Orders:  -     phentermine 37.5 MG capsule; Take 1 capsule by mouth Every Morning.  Dispense: 30 capsule; Refill: 0    2. Acne, unspecified acne type  -     tretinoin (Retin-A) 0.1 % cream; Apply 1 Application topically to the appropriate area as directed Every Night.  Dispense: 45 g; Refill: 2        Follow Up   Return in about 1 month (around 8/17/2025) for Recheck.  Patient was given instructions and counseling regarding her condition or for health maintenance advice. Please see specific information pulled into the AVS if appropriate.

## 2025-07-17 NOTE — ASSESSMENT & PLAN NOTE
Patient's weight is at 211. She has lost about 4 lbs  She is doing well on phentermine.  She denies any side effects  Will continue with phentermine.